# Patient Record
Sex: FEMALE | Race: BLACK OR AFRICAN AMERICAN | Employment: UNEMPLOYED | URBAN - METROPOLITAN AREA
[De-identification: names, ages, dates, MRNs, and addresses within clinical notes are randomized per-mention and may not be internally consistent; named-entity substitution may affect disease eponyms.]

---

## 2017-01-01 ENCOUNTER — APPOINTMENT (OUTPATIENT)
Dept: GENERAL RADIOLOGY | Age: 82
DRG: 064 | End: 2017-01-01
Attending: EMERGENCY MEDICINE
Payer: MEDICARE

## 2017-01-01 ENCOUNTER — APPOINTMENT (OUTPATIENT)
Dept: MRI IMAGING | Age: 82
DRG: 064 | End: 2017-01-01
Attending: INTERNAL MEDICINE
Payer: MEDICARE

## 2017-01-01 ENCOUNTER — HOSPICE ADMISSION (OUTPATIENT)
Dept: HOSPICE | Facility: HOSPICE | Age: 82
End: 2017-01-01
Payer: MEDICARE

## 2017-01-01 ENCOUNTER — APPOINTMENT (OUTPATIENT)
Dept: MRI IMAGING | Age: 82
DRG: 064 | End: 2017-01-01
Attending: PSYCHIATRY & NEUROLOGY
Payer: MEDICARE

## 2017-01-01 ENCOUNTER — HOME CARE VISIT (OUTPATIENT)
Dept: HOSPICE | Facility: HOSPICE | Age: 82
End: 2017-01-01
Payer: MEDICARE

## 2017-01-01 ENCOUNTER — APPOINTMENT (OUTPATIENT)
Dept: CT IMAGING | Age: 82
DRG: 064 | End: 2017-01-01
Attending: EMERGENCY MEDICINE
Payer: MEDICARE

## 2017-01-01 ENCOUNTER — HOSPITAL ENCOUNTER (INPATIENT)
Age: 82
LOS: 5 days | Discharge: HOSPICE | DRG: 064 | End: 2017-01-06
Attending: EMERGENCY MEDICINE | Admitting: INTERNAL MEDICINE
Payer: MEDICARE

## 2017-01-01 ENCOUNTER — HOSPITAL ENCOUNTER (INPATIENT)
Age: 82
LOS: 4 days | DRG: 065 | End: 2017-01-10
Attending: INTERNAL MEDICINE | Admitting: INTERNAL MEDICINE
Payer: OTHER MISCELLANEOUS

## 2017-01-01 VITALS
OXYGEN SATURATION: 92 % | HEART RATE: 70 BPM | RESPIRATION RATE: 25 BRPM | TEMPERATURE: 98.3 F | SYSTOLIC BLOOD PRESSURE: 160 MMHG | HEIGHT: 65 IN | DIASTOLIC BLOOD PRESSURE: 70 MMHG | WEIGHT: 190.92 LBS | BODY MASS INDEX: 31.81 KG/M2

## 2017-01-01 VITALS
RESPIRATION RATE: 24 BRPM | DIASTOLIC BLOOD PRESSURE: 94 MMHG | SYSTOLIC BLOOD PRESSURE: 187 MMHG | HEART RATE: 91 BPM | OXYGEN SATURATION: 91 % | TEMPERATURE: 96.9 F

## 2017-01-01 VITALS
OXYGEN SATURATION: 92 % | RESPIRATION RATE: 18 BRPM | WEIGHT: 191 LBS | HEART RATE: 70 BPM | SYSTOLIC BLOOD PRESSURE: 160 MMHG | BODY MASS INDEX: 31.82 KG/M2 | TEMPERATURE: 98.3 F | HEIGHT: 65 IN | DIASTOLIC BLOOD PRESSURE: 70 MMHG

## 2017-01-01 DIAGNOSIS — Z71.89 COUNSELING REGARDING GOALS OF CARE: ICD-10-CM

## 2017-01-01 DIAGNOSIS — R41.82 ALTERED MENTAL STATUS, UNSPECIFIED ALTERED MENTAL STATUS TYPE: Primary | ICD-10-CM

## 2017-01-01 DIAGNOSIS — I69.351 HEMIPLEGIA AND HEMIPARESIS FOLLOWING CEREBRAL INFARCTION AFFECTING RIGHT DOMINANT SIDE (HCC): ICD-10-CM

## 2017-01-01 DIAGNOSIS — I65.23 STENOSIS OF BOTH INTERNAL CAROTID ARTERIES: ICD-10-CM

## 2017-01-01 DIAGNOSIS — I69.320 APHASIA DUE TO RECENT CEREBROVASCULAR ACCIDENT (CVA): ICD-10-CM

## 2017-01-01 DIAGNOSIS — R55 CONVULSIVE SYNCOPE: ICD-10-CM

## 2017-01-01 DIAGNOSIS — E11.42 DIABETIC PERIPHERAL NEUROPATHY ASSOCIATED WITH TYPE 2 DIABETES MELLITUS (HCC): ICD-10-CM

## 2017-01-01 DIAGNOSIS — R55 SYNCOPE AND COLLAPSE: ICD-10-CM

## 2017-01-01 DIAGNOSIS — I63.312 THROMBOTIC STROKE INVOLVING LEFT MIDDLE CEREBRAL ARTERY (HCC): ICD-10-CM

## 2017-01-01 DIAGNOSIS — R53.81 PHYSICAL DEBILITY: ICD-10-CM

## 2017-01-01 PROBLEM — I63.9 CVA (CEREBRAL VASCULAR ACCIDENT) (HCC): Status: ACTIVE | Noted: 2017-01-01

## 2017-01-01 LAB
25(OH)D2 SERPL-MCNC: <1 NG/ML
25(OH)D3 SERPL-MCNC: 60 NG/ML
25(OH)D3+25(OH)D2 SERPL-MCNC: 61 NG/ML
ALBUMIN SERPL BCP-MCNC: 3.2 G/DL (ref 3.5–5)
ALBUMIN SERPL BCP-MCNC: 3.2 G/DL (ref 3.5–5)
ALBUMIN SERPL BCP-MCNC: 3.3 G/DL (ref 3.5–5)
ALBUMIN/GLOB SERPL: 0.7 {RATIO} (ref 1.1–2.2)
ALBUMIN/GLOB SERPL: 0.8 {RATIO} (ref 1.1–2.2)
ALBUMIN/GLOB SERPL: 0.8 {RATIO} (ref 1.1–2.2)
ALP SERPL-CCNC: 92 U/L (ref 45–117)
ALP SERPL-CCNC: 96 U/L (ref 45–117)
ALP SERPL-CCNC: 98 U/L (ref 45–117)
ALT SERPL-CCNC: 25 U/L (ref 12–78)
ALT SERPL-CCNC: 25 U/L (ref 12–78)
ALT SERPL-CCNC: 32 U/L (ref 12–78)
ANA SER QL: NEGATIVE
ANION GAP BLD CALC-SCNC: 13 MMOL/L (ref 5–15)
ANION GAP BLD CALC-SCNC: 13 MMOL/L (ref 5–15)
ANION GAP BLD CALC-SCNC: 14 MMOL/L (ref 5–15)
ANION GAP BLD CALC-SCNC: 14 MMOL/L (ref 5–15)
ANION GAP BLD CALC-SCNC: 18 MMOL/L (ref 5–15)
APPEARANCE UR: ABNORMAL
APTT PPP: 25.4 SEC (ref 22.1–32.5)
AST SERPL W P-5'-P-CCNC: 22 U/L (ref 15–37)
AST SERPL W P-5'-P-CCNC: 33 U/L (ref 15–37)
AST SERPL W P-5'-P-CCNC: 39 U/L (ref 15–37)
ATRIAL RATE: 112 BPM
BACTERIA URNS QL MICRO: NEGATIVE /HPF
BASOPHILS # BLD AUTO: 0 K/UL (ref 0–0.1)
BASOPHILS # BLD: 0 % (ref 0–1)
BILIRUB SERPL-MCNC: 0.3 MG/DL (ref 0.2–1)
BILIRUB UR QL: NEGATIVE
BUN SERPL-MCNC: 47 MG/DL (ref 6–20)
BUN SERPL-MCNC: 49 MG/DL (ref 6–20)
BUN SERPL-MCNC: 50 MG/DL (ref 6–20)
BUN SERPL-MCNC: 52 MG/DL (ref 6–20)
BUN SERPL-MCNC: 52 MG/DL (ref 6–20)
BUN/CREAT SERPL: 21 (ref 12–20)
BUN/CREAT SERPL: 22 (ref 12–20)
BUN/CREAT SERPL: 23 (ref 12–20)
CALCIUM SERPL-MCNC: 8.3 MG/DL (ref 8.5–10.1)
CALCIUM SERPL-MCNC: 8.6 MG/DL (ref 8.5–10.1)
CALCIUM SERPL-MCNC: 8.8 MG/DL (ref 8.5–10.1)
CALCIUM SERPL-MCNC: 9.3 MG/DL (ref 8.5–10.1)
CALCIUM SERPL-MCNC: 9.5 MG/DL (ref 8.5–10.1)
CALCULATED P AXIS, ECG09: 54 DEGREES
CALCULATED R AXIS, ECG10: 60 DEGREES
CALCULATED T AXIS, ECG11: 24 DEGREES
CHLORIDE SERPL-SCNC: 109 MMOL/L (ref 97–108)
CHLORIDE SERPL-SCNC: 111 MMOL/L (ref 97–108)
CHLORIDE SERPL-SCNC: 111 MMOL/L (ref 97–108)
CHLORIDE SERPL-SCNC: 112 MMOL/L (ref 97–108)
CHLORIDE SERPL-SCNC: 112 MMOL/L (ref 97–108)
CHOLEST SERPL-MCNC: 221 MG/DL
CK MB CFR SERPL CALC: 1.5 % (ref 0–2.5)
CK MB SERPL-MCNC: 2.5 NG/ML (ref 5–25)
CK SERPL-CCNC: 172 U/L (ref 26–192)
CK SERPL-CCNC: 490 U/L (ref 26–192)
CO2 SERPL-SCNC: 15 MMOL/L (ref 21–32)
CO2 SERPL-SCNC: 19 MMOL/L (ref 21–32)
CO2 SERPL-SCNC: 22 MMOL/L (ref 21–32)
CO2 SERPL-SCNC: 24 MMOL/L (ref 21–32)
CO2 SERPL-SCNC: 25 MMOL/L (ref 21–32)
COLOR UR: ABNORMAL
CREAT SERPL-MCNC: 2.22 MG/DL (ref 0.55–1.02)
CREAT SERPL-MCNC: 2.24 MG/DL (ref 0.55–1.02)
CREAT SERPL-MCNC: 2.31 MG/DL (ref 0.55–1.02)
CREAT SERPL-MCNC: 2.36 MG/DL (ref 0.55–1.02)
CREAT SERPL-MCNC: 2.41 MG/DL (ref 0.55–1.02)
DIAGNOSIS, 93000: NORMAL
EOSINOPHIL # BLD: 0 K/UL (ref 0–0.4)
EOSINOPHIL # BLD: 0.3 K/UL (ref 0–0.4)
EOSINOPHIL NFR BLD: 0 % (ref 0–7)
EOSINOPHIL NFR BLD: 3 % (ref 0–7)
EPITH CASTS URNS QL MICRO: ABNORMAL /LPF
ERYTHROCYTE [DISTWIDTH] IN BLOOD BY AUTOMATED COUNT: 14.2 % (ref 11.5–14.5)
ERYTHROCYTE [DISTWIDTH] IN BLOOD BY AUTOMATED COUNT: 14.3 % (ref 11.5–14.5)
ERYTHROCYTE [DISTWIDTH] IN BLOOD BY AUTOMATED COUNT: 14.6 % (ref 11.5–14.5)
ERYTHROCYTE [DISTWIDTH] IN BLOOD BY AUTOMATED COUNT: 14.7 % (ref 11.5–14.5)
ERYTHROCYTE [DISTWIDTH] IN BLOOD BY AUTOMATED COUNT: 15.1 % (ref 11.5–14.5)
ERYTHROCYTE [SEDIMENTATION RATE] IN BLOOD: 68 MM/HR (ref 0–30)
EST. AVERAGE GLUCOSE BLD GHB EST-MCNC: 166 MG/DL
EST. AVERAGE GLUCOSE BLD GHB EST-MCNC: 180 MG/DL
GLOBULIN SER CALC-MCNC: 4.2 G/DL (ref 2–4)
GLOBULIN SER CALC-MCNC: 4.4 G/DL (ref 2–4)
GLOBULIN SER CALC-MCNC: 4.4 G/DL (ref 2–4)
GLUCOSE BLD STRIP.AUTO-MCNC: 129 MG/DL (ref 65–100)
GLUCOSE BLD STRIP.AUTO-MCNC: 133 MG/DL (ref 65–100)
GLUCOSE BLD STRIP.AUTO-MCNC: 137 MG/DL (ref 65–100)
GLUCOSE BLD STRIP.AUTO-MCNC: 150 MG/DL (ref 65–100)
GLUCOSE BLD STRIP.AUTO-MCNC: 171 MG/DL (ref 65–100)
GLUCOSE BLD STRIP.AUTO-MCNC: 175 MG/DL (ref 65–100)
GLUCOSE BLD STRIP.AUTO-MCNC: 178 MG/DL (ref 65–100)
GLUCOSE BLD STRIP.AUTO-MCNC: 178 MG/DL (ref 65–100)
GLUCOSE BLD STRIP.AUTO-MCNC: 181 MG/DL (ref 65–100)
GLUCOSE BLD STRIP.AUTO-MCNC: 188 MG/DL (ref 65–100)
GLUCOSE BLD STRIP.AUTO-MCNC: 190 MG/DL (ref 65–100)
GLUCOSE BLD STRIP.AUTO-MCNC: 191 MG/DL (ref 65–100)
GLUCOSE BLD STRIP.AUTO-MCNC: 195 MG/DL (ref 65–100)
GLUCOSE BLD STRIP.AUTO-MCNC: 196 MG/DL (ref 65–100)
GLUCOSE BLD STRIP.AUTO-MCNC: 200 MG/DL (ref 65–100)
GLUCOSE BLD STRIP.AUTO-MCNC: 200 MG/DL (ref 65–100)
GLUCOSE BLD STRIP.AUTO-MCNC: 205 MG/DL (ref 65–100)
GLUCOSE BLD STRIP.AUTO-MCNC: 217 MG/DL (ref 65–100)
GLUCOSE BLD STRIP.AUTO-MCNC: 217 MG/DL (ref 65–100)
GLUCOSE BLD STRIP.AUTO-MCNC: 227 MG/DL (ref 65–100)
GLUCOSE SERPL-MCNC: 122 MG/DL (ref 65–100)
GLUCOSE SERPL-MCNC: 159 MG/DL (ref 65–100)
GLUCOSE SERPL-MCNC: 183 MG/DL (ref 65–100)
GLUCOSE SERPL-MCNC: 195 MG/DL (ref 65–100)
GLUCOSE SERPL-MCNC: 200 MG/DL (ref 65–100)
GLUCOSE UR STRIP.AUTO-MCNC: NEGATIVE MG/DL
HBA1C MFR BLD: 7.4 % (ref 4.2–6.3)
HBA1C MFR BLD: 7.9 % (ref 4.2–6.3)
HCT VFR BLD AUTO: 32 % (ref 35–47)
HCT VFR BLD AUTO: 32.6 % (ref 35–47)
HCT VFR BLD AUTO: 33 % (ref 35–47)
HCT VFR BLD AUTO: 33 % (ref 35–47)
HCT VFR BLD AUTO: 33.2 % (ref 35–47)
HCYS SERPL-SCNC: 21.2 UMOL/L (ref 3.7–13.9)
HDLC SERPL-MCNC: 45 MG/DL
HDLC SERPL: 4.9 {RATIO} (ref 0–5)
HGB BLD-MCNC: 10.8 G/DL (ref 11.5–16)
HGB BLD-MCNC: 10.9 G/DL (ref 11.5–16)
HGB BLD-MCNC: 11 G/DL (ref 11.5–16)
HGB BLD-MCNC: 11 G/DL (ref 11.5–16)
HGB BLD-MCNC: 11.1 G/DL (ref 11.5–16)
HGB UR QL STRIP: ABNORMAL
HYALINE CASTS URNS QL MICRO: ABNORMAL /LPF (ref 0–5)
INR PPP: 1.1 (ref 0.9–1.1)
KETONES UR QL STRIP.AUTO: NEGATIVE MG/DL
LDLC SERPL CALC-MCNC: 132.6 MG/DL (ref 0–100)
LEUKOCYTE ESTERASE UR QL STRIP.AUTO: NEGATIVE
LIPID PROFILE,FLP: ABNORMAL
LYMPHOCYTES # BLD AUTO: 14 % (ref 12–49)
LYMPHOCYTES # BLD AUTO: 16 % (ref 12–49)
LYMPHOCYTES # BLD AUTO: 33 % (ref 12–49)
LYMPHOCYTES # BLD AUTO: 9 % (ref 12–49)
LYMPHOCYTES # BLD: 1.1 K/UL (ref 0.8–3.5)
LYMPHOCYTES # BLD: 1.8 K/UL (ref 0.8–3.5)
LYMPHOCYTES # BLD: 2 K/UL (ref 0.8–3.5)
LYMPHOCYTES # BLD: 3.7 K/UL (ref 0.8–3.5)
MAGNESIUM SERPL-MCNC: 2.1 MG/DL (ref 1.6–2.4)
MAGNESIUM SERPL-MCNC: 2.1 MG/DL (ref 1.6–2.4)
MAGNESIUM SERPL-MCNC: 2.2 MG/DL (ref 1.6–2.4)
MCH RBC QN AUTO: 30.9 PG (ref 26–34)
MCH RBC QN AUTO: 31 PG (ref 26–34)
MCH RBC QN AUTO: 31.4 PG (ref 26–34)
MCH RBC QN AUTO: 32.1 PG (ref 26–34)
MCH RBC QN AUTO: 32.3 PG (ref 26–34)
MCHC RBC AUTO-ENTMCNC: 32.8 G/DL (ref 30–36.5)
MCHC RBC AUTO-ENTMCNC: 33.1 G/DL (ref 30–36.5)
MCHC RBC AUTO-ENTMCNC: 33.3 G/DL (ref 30–36.5)
MCHC RBC AUTO-ENTMCNC: 33.6 G/DL (ref 30–36.5)
MCHC RBC AUTO-ENTMCNC: 34.4 G/DL (ref 30–36.5)
MCV RBC AUTO: 93 FL (ref 80–99)
MCV RBC AUTO: 93.1 FL (ref 80–99)
MCV RBC AUTO: 93.8 FL (ref 80–99)
MCV RBC AUTO: 95.4 FL (ref 80–99)
MCV RBC AUTO: 95.7 FL (ref 80–99)
MONOCYTES # BLD: 0.8 K/UL (ref 0–1)
MONOCYTES # BLD: 0.9 K/UL (ref 0–1)
MONOCYTES # BLD: 1 K/UL (ref 0–1)
MONOCYTES # BLD: 2 K/UL (ref 0–1)
MONOCYTES NFR BLD AUTO: 16 % (ref 5–13)
MONOCYTES NFR BLD AUTO: 7 % (ref 5–13)
MONOCYTES NFR BLD AUTO: 8 % (ref 5–13)
MONOCYTES NFR BLD AUTO: 8 % (ref 5–13)
NEUTS SEG # BLD: 10 K/UL (ref 1.8–8)
NEUTS SEG # BLD: 6.3 K/UL (ref 1.8–8)
NEUTS SEG # BLD: 8.5 K/UL (ref 1.8–8)
NEUTS SEG # BLD: 9.9 K/UL (ref 1.8–8)
NEUTS SEG NFR BLD AUTO: 56 % (ref 32–75)
NEUTS SEG NFR BLD AUTO: 68 % (ref 32–75)
NEUTS SEG NFR BLD AUTO: 78 % (ref 32–75)
NEUTS SEG NFR BLD AUTO: 84 % (ref 32–75)
NITRITE UR QL STRIP.AUTO: NEGATIVE
P-R INTERVAL, ECG05: 138 MS
PH UR STRIP: 5.5 [PH] (ref 5–8)
PLATELET # BLD AUTO: 271 K/UL (ref 150–400)
PLATELET # BLD AUTO: 311 K/UL (ref 150–400)
PLATELET # BLD AUTO: 318 K/UL (ref 150–400)
PLATELET # BLD AUTO: 319 K/UL (ref 150–400)
PLATELET # BLD AUTO: 331 K/UL (ref 150–400)
POTASSIUM SERPL-SCNC: 3.1 MMOL/L (ref 3.5–5.1)
POTASSIUM SERPL-SCNC: 3.4 MMOL/L (ref 3.5–5.1)
POTASSIUM SERPL-SCNC: 4 MMOL/L (ref 3.5–5.1)
POTASSIUM SERPL-SCNC: 4.1 MMOL/L (ref 3.5–5.1)
POTASSIUM SERPL-SCNC: 4.3 MMOL/L (ref 3.5–5.1)
PROT SERPL-MCNC: 7.4 G/DL (ref 6.4–8.2)
PROT SERPL-MCNC: 7.6 G/DL (ref 6.4–8.2)
PROT SERPL-MCNC: 7.7 G/DL (ref 6.4–8.2)
PROT UR STRIP-MCNC: 100 MG/DL
PROTHROMBIN TIME: 10.7 SEC (ref 9–11.1)
Q-T INTERVAL, ECG07: 310 MS
QRS DURATION, ECG06: 84 MS
QTC CALCULATION (BEZET), ECG08: 423 MS
RBC # BLD AUTO: 3.41 M/UL (ref 3.8–5.2)
RBC # BLD AUTO: 3.46 M/UL (ref 3.8–5.2)
RBC # BLD AUTO: 3.47 M/UL (ref 3.8–5.2)
RBC # BLD AUTO: 3.5 M/UL (ref 3.8–5.2)
RBC # BLD AUTO: 3.55 M/UL (ref 3.8–5.2)
RBC #/AREA URNS HPF: ABNORMAL /HPF (ref 0–5)
SEE BELOW:, 164879: NORMAL
SERVICE CMNT-IMP: ABNORMAL
SODIUM SERPL-SCNC: 144 MMOL/L (ref 136–145)
SODIUM SERPL-SCNC: 145 MMOL/L (ref 136–145)
SODIUM SERPL-SCNC: 145 MMOL/L (ref 136–145)
SODIUM SERPL-SCNC: 149 MMOL/L (ref 136–145)
SODIUM SERPL-SCNC: 149 MMOL/L (ref 136–145)
SP GR UR REFRACTOMETRY: 1.02 (ref 1–1.03)
THERAPEUTIC RANGE,PTTT: NORMAL SECS (ref 58–77)
TRIGL SERPL-MCNC: 217 MG/DL (ref ?–150)
TROPONIN I SERPL-MCNC: 0.04 NG/ML
TSH SERPL DL<=0.05 MIU/L-ACNC: 1.59 UIU/ML (ref 0.36–3.74)
UA: UC IF INDICATED,UAUC: ABNORMAL
UROBILINOGEN UR QL STRIP.AUTO: 0.2 EU/DL (ref 0.2–1)
VENTRICULAR RATE, ECG03: 112 BPM
VIT B12 SERPL-MCNC: 1228 PG/ML (ref 211–911)
VLDLC SERPL CALC-MCNC: 43.4 MG/DL
WBC # BLD AUTO: 11.1 K/UL (ref 3.6–11)
WBC # BLD AUTO: 12 K/UL (ref 3.6–11)
WBC # BLD AUTO: 12.5 K/UL (ref 3.6–11)
WBC # BLD AUTO: 12.7 K/UL (ref 3.6–11)
WBC # BLD AUTO: 12.7 K/UL (ref 3.6–11)
WBC URNS QL MICRO: ABNORMAL /HPF (ref 0–4)

## 2017-01-01 PROCEDURE — 0651 HSPC ROUTINE HOME CARE

## 2017-01-01 PROCEDURE — 74011000250 HC RX REV CODE- 250: Performed by: NURSE PRACTITIONER

## 2017-01-01 PROCEDURE — 74011250637 HC RX REV CODE- 250/637: Performed by: INTERNAL MEDICINE

## 2017-01-01 PROCEDURE — 85025 COMPLETE CBC W/AUTO DIFF WBC: CPT | Performed by: INTERNAL MEDICINE

## 2017-01-01 PROCEDURE — 83735 ASSAY OF MAGNESIUM: CPT | Performed by: PSYCHIATRY & NEUROLOGY

## 2017-01-01 PROCEDURE — 70551 MRI BRAIN STEM W/O DYE: CPT

## 2017-01-01 PROCEDURE — G0299 HHS/HOSPICE OF RN EA 15 MIN: HCPCS

## 2017-01-01 PROCEDURE — 71010 XR CHEST PORT: CPT

## 2017-01-01 PROCEDURE — 74011636637 HC RX REV CODE- 636/637: Performed by: INTERNAL MEDICINE

## 2017-01-01 PROCEDURE — 3336500001 HSPC ELECTION

## 2017-01-01 PROCEDURE — 0656 HSPC GENERAL INPATIENT

## 2017-01-01 PROCEDURE — 82607 VITAMIN B-12: CPT | Performed by: PSYCHIATRY & NEUROLOGY

## 2017-01-01 PROCEDURE — 36415 COLL VENOUS BLD VENIPUNCTURE: CPT | Performed by: INTERNAL MEDICINE

## 2017-01-01 PROCEDURE — 74011250636 HC RX REV CODE- 250/636: Performed by: INTERNAL MEDICINE

## 2017-01-01 PROCEDURE — 93005 ELECTROCARDIOGRAM TRACING: CPT

## 2017-01-01 PROCEDURE — 65270000015 HC RM PRIVATE ONCOLOGY

## 2017-01-01 PROCEDURE — 74011000258 HC RX REV CODE- 258: Performed by: INTERNAL MEDICINE

## 2017-01-01 PROCEDURE — 84484 ASSAY OF TROPONIN QUANT: CPT | Performed by: EMERGENCY MEDICINE

## 2017-01-01 PROCEDURE — 74011250637 HC RX REV CODE- 250/637: Performed by: NURSE PRACTITIONER

## 2017-01-01 PROCEDURE — 80053 COMPREHEN METABOLIC PANEL: CPT | Performed by: EMERGENCY MEDICINE

## 2017-01-01 PROCEDURE — 84443 ASSAY THYROID STIM HORMONE: CPT | Performed by: PSYCHIATRY & NEUROLOGY

## 2017-01-01 PROCEDURE — 74011250637 HC RX REV CODE- 250/637: Performed by: EMERGENCY MEDICINE

## 2017-01-01 PROCEDURE — 65660000000 HC RM CCU STEPDOWN

## 2017-01-01 PROCEDURE — 83090 ASSAY OF HOMOCYSTEINE: CPT | Performed by: PSYCHIATRY & NEUROLOGY

## 2017-01-01 PROCEDURE — 74011000250 HC RX REV CODE- 250: Performed by: INTERNAL MEDICINE

## 2017-01-01 PROCEDURE — 82962 GLUCOSE BLOOD TEST: CPT

## 2017-01-01 PROCEDURE — 80053 COMPREHEN METABOLIC PANEL: CPT | Performed by: INTERNAL MEDICINE

## 2017-01-01 PROCEDURE — 36415 COLL VENOUS BLD VENIPUNCTURE: CPT | Performed by: EMERGENCY MEDICINE

## 2017-01-01 PROCEDURE — 82306 VITAMIN D 25 HYDROXY: CPT | Performed by: PSYCHIATRY & NEUROLOGY

## 2017-01-01 PROCEDURE — 83735 ASSAY OF MAGNESIUM: CPT | Performed by: INTERNAL MEDICINE

## 2017-01-01 PROCEDURE — 81001 URINALYSIS AUTO W/SCOPE: CPT | Performed by: EMERGENCY MEDICINE

## 2017-01-01 PROCEDURE — 80048 BASIC METABOLIC PNL TOTAL CA: CPT | Performed by: INTERNAL MEDICINE

## 2017-01-01 PROCEDURE — 82550 ASSAY OF CK (CPK): CPT | Performed by: EMERGENCY MEDICINE

## 2017-01-01 PROCEDURE — 85652 RBC SED RATE AUTOMATED: CPT | Performed by: PSYCHIATRY & NEUROLOGY

## 2017-01-01 PROCEDURE — 83036 HEMOGLOBIN GLYCOSYLATED A1C: CPT | Performed by: INTERNAL MEDICINE

## 2017-01-01 PROCEDURE — 86038 ANTINUCLEAR ANTIBODIES: CPT | Performed by: PSYCHIATRY & NEUROLOGY

## 2017-01-01 PROCEDURE — 85730 THROMBOPLASTIN TIME PARTIAL: CPT | Performed by: EMERGENCY MEDICINE

## 2017-01-01 PROCEDURE — 74011000250 HC RX REV CODE- 250: Performed by: EMERGENCY MEDICINE

## 2017-01-01 PROCEDURE — 76450000000

## 2017-01-01 PROCEDURE — 82550 ASSAY OF CK (CPK): CPT | Performed by: PSYCHIATRY & NEUROLOGY

## 2017-01-01 PROCEDURE — 93306 TTE W/DOPPLER COMPLETE: CPT

## 2017-01-01 PROCEDURE — 85027 COMPLETE CBC AUTOMATED: CPT | Performed by: INTERNAL MEDICINE

## 2017-01-01 PROCEDURE — 85025 COMPLETE CBC W/AUTO DIFF WBC: CPT | Performed by: EMERGENCY MEDICINE

## 2017-01-01 PROCEDURE — 80061 LIPID PANEL: CPT | Performed by: INTERNAL MEDICINE

## 2017-01-01 PROCEDURE — 74011250636 HC RX REV CODE- 250/636: Performed by: PSYCHIATRY & NEUROLOGY

## 2017-01-01 PROCEDURE — 95816 EEG AWAKE AND DROWSY: CPT | Performed by: PSYCHIATRY & NEUROLOGY

## 2017-01-01 PROCEDURE — 77030005538 HC CATH URETH FOL44 BARD -B

## 2017-01-01 PROCEDURE — 77030005563 HC CATH URETH INT MMGH -A

## 2017-01-01 PROCEDURE — 85610 PROTHROMBIN TIME: CPT | Performed by: EMERGENCY MEDICINE

## 2017-01-01 PROCEDURE — 70450 CT HEAD/BRAIN W/O DYE: CPT

## 2017-01-01 PROCEDURE — 93880 EXTRACRANIAL BILAT STUDY: CPT

## 2017-01-01 PROCEDURE — 99285 EMERGENCY DEPT VISIT HI MDM: CPT

## 2017-01-01 RX ORDER — INSULIN GLARGINE 100 [IU]/ML
18 INJECTION, SOLUTION SUBCUTANEOUS
Status: DISCONTINUED | OUTPATIENT
Start: 2017-01-01 | End: 2017-01-01

## 2017-01-01 RX ORDER — LABETALOL HCL 20 MG/4 ML
10 SYRINGE (ML) INTRAVENOUS
Status: DISCONTINUED | OUTPATIENT
Start: 2017-01-01 | End: 2017-01-01

## 2017-01-01 RX ORDER — MORPHINE SULFATE 2 MG/ML
2 INJECTION, SOLUTION INTRAMUSCULAR; INTRAVENOUS EVERY 4 HOURS
Status: DISCONTINUED | OUTPATIENT
Start: 2017-01-01 | End: 2017-01-01 | Stop reason: HOSPADM

## 2017-01-01 RX ORDER — DILTIAZEM HYDROCHLORIDE 5 MG/ML
10 INJECTION INTRAVENOUS ONCE
Status: COMPLETED | OUTPATIENT
Start: 2017-01-01 | End: 2017-01-01

## 2017-01-01 RX ORDER — ASPIRIN 300 MG/1
300 SUPPOSITORY RECTAL DAILY
Status: DISCONTINUED | OUTPATIENT
Start: 2017-01-01 | End: 2017-01-01 | Stop reason: SDUPTHER

## 2017-01-01 RX ORDER — LORAZEPAM 2 MG/ML
1-2 INJECTION INTRAMUSCULAR
Status: DISCONTINUED | OUTPATIENT
Start: 2017-01-01 | End: 2017-01-01 | Stop reason: HOSPADM

## 2017-01-01 RX ORDER — METOPROLOL TARTRATE 5 MG/5ML
5 INJECTION INTRAVENOUS EVERY 6 HOURS
Status: DISCONTINUED | OUTPATIENT
Start: 2017-01-01 | End: 2017-01-01

## 2017-01-01 RX ORDER — INSULIN GLARGINE 100 [IU]/ML
20 INJECTION, SOLUTION SUBCUTANEOUS
Status: DISCONTINUED | OUTPATIENT
Start: 2017-01-01 | End: 2017-01-01

## 2017-01-01 RX ORDER — SCOLOPAMINE TRANSDERMAL SYSTEM 1 MG/1
1.5 PATCH, EXTENDED RELEASE TRANSDERMAL
Status: DISCONTINUED | OUTPATIENT
Start: 2017-01-01 | End: 2017-01-01 | Stop reason: HOSPADM

## 2017-01-01 RX ORDER — SODIUM CHLORIDE 450 MG/100ML
75 INJECTION, SOLUTION INTRAVENOUS CONTINUOUS
Status: DISCONTINUED | OUTPATIENT
Start: 2017-01-01 | End: 2017-01-01

## 2017-01-01 RX ORDER — MORPHINE SULFATE 20 MG/ML
5 SOLUTION ORAL
Status: DISCONTINUED | OUTPATIENT
Start: 2017-01-01 | End: 2017-01-01

## 2017-01-01 RX ORDER — CLONIDINE 0.3 MG/24H
1 PATCH, EXTENDED RELEASE TRANSDERMAL
Status: DISCONTINUED | OUTPATIENT
Start: 2017-01-01 | End: 2017-01-01

## 2017-01-01 RX ORDER — CALCIUM CARBONATE 500(1250)
1 TABLET ORAL DAILY
Status: ON HOLD | COMMUNITY
End: 2017-01-01

## 2017-01-01 RX ORDER — DEXTROSE 50 % IN WATER (D50W) INTRAVENOUS SYRINGE
12.5-25 AS NEEDED
Status: DISCONTINUED | OUTPATIENT
Start: 2017-01-01 | End: 2017-01-01

## 2017-01-01 RX ORDER — CLONIDINE HYDROCHLORIDE 0.3 MG/1
0.3 TABLET ORAL 2 TIMES DAILY
Status: ON HOLD | COMMUNITY
End: 2017-01-01

## 2017-01-01 RX ORDER — HYDRALAZINE HYDROCHLORIDE 20 MG/ML
10 INJECTION INTRAMUSCULAR; INTRAVENOUS EVERY 6 HOURS
Status: DISCONTINUED | OUTPATIENT
Start: 2017-01-01 | End: 2017-01-01

## 2017-01-01 RX ORDER — MAGNESIUM SULFATE 100 %
4 CRYSTALS MISCELLANEOUS AS NEEDED
Status: DISCONTINUED | OUTPATIENT
Start: 2017-01-01 | End: 2017-01-01

## 2017-01-01 RX ORDER — INSULIN LISPRO 100 [IU]/ML
INJECTION, SOLUTION INTRAVENOUS; SUBCUTANEOUS EVERY 6 HOURS
Status: DISCONTINUED | OUTPATIENT
Start: 2017-01-01 | End: 2017-01-01

## 2017-01-01 RX ORDER — SODIUM CHLORIDE 9 MG/ML
50 INJECTION, SOLUTION INTRAVENOUS CONTINUOUS
Status: DISCONTINUED | OUTPATIENT
Start: 2017-01-01 | End: 2017-01-01

## 2017-01-01 RX ORDER — SODIUM CHLORIDE 0.9 % (FLUSH) 0.9 %
5-10 SYRINGE (ML) INJECTION EVERY 8 HOURS
Status: DISCONTINUED | OUTPATIENT
Start: 2017-01-01 | End: 2017-01-01 | Stop reason: HOSPADM

## 2017-01-01 RX ORDER — ACETAMINOPHEN 325 MG/1
650 TABLET ORAL
Status: DISCONTINUED | OUTPATIENT
Start: 2017-01-01 | End: 2017-01-01

## 2017-01-01 RX ORDER — DILTIAZEM HYDROCHLORIDE 5 MG/ML
10 INJECTION INTRAVENOUS ONCE
Status: ACTIVE | OUTPATIENT
Start: 2017-01-01 | End: 2017-01-01

## 2017-01-01 RX ORDER — ASPIRIN 300 MG/1
300 SUPPOSITORY RECTAL DAILY
Status: DISCONTINUED | OUTPATIENT
Start: 2017-01-01 | End: 2017-01-01

## 2017-01-01 RX ORDER — DEXTROSE, SODIUM CHLORIDE, AND POTASSIUM CHLORIDE 5; .45; .15 G/100ML; G/100ML; G/100ML
100 INJECTION INTRAVENOUS CONTINUOUS
Status: DISCONTINUED | OUTPATIENT
Start: 2017-01-01 | End: 2017-01-01

## 2017-01-01 RX ORDER — LABETALOL HCL 20 MG/4 ML
5 SYRINGE (ML) INTRAVENOUS
Status: DISCONTINUED | OUTPATIENT
Start: 2017-01-01 | End: 2017-01-01

## 2017-01-01 RX ORDER — FACIAL-BODY WIPES
10 EACH TOPICAL DAILY PRN
Status: DISCONTINUED | OUTPATIENT
Start: 2017-01-01 | End: 2017-01-01 | Stop reason: HOSPADM

## 2017-01-01 RX ORDER — SODIUM CHLORIDE 0.9 % (FLUSH) 0.9 %
5-10 SYRINGE (ML) INJECTION AS NEEDED
Status: DISCONTINUED | OUTPATIENT
Start: 2017-01-01 | End: 2017-01-01 | Stop reason: HOSPADM

## 2017-01-01 RX ORDER — GLYCOPYRROLATE 0.2 MG/ML
0.2 INJECTION INTRAMUSCULAR; INTRAVENOUS EVERY 4 HOURS
Status: DISCONTINUED | OUTPATIENT
Start: 2017-01-01 | End: 2017-01-01 | Stop reason: HOSPADM

## 2017-01-01 RX ORDER — CLONIDINE 0.3 MG/24H
1 PATCH, EXTENDED RELEASE TRANSDERMAL
Status: DISCONTINUED | OUTPATIENT
Start: 2017-01-01 | End: 2017-01-01 | Stop reason: HOSPADM

## 2017-01-01 RX ORDER — HEPARIN SODIUM 5000 [USP'U]/ML
5000 INJECTION, SOLUTION INTRAVENOUS; SUBCUTANEOUS EVERY 12 HOURS
Status: DISCONTINUED | OUTPATIENT
Start: 2017-01-01 | End: 2017-01-01

## 2017-01-01 RX ORDER — ASPIRIN 300 MG/1
300 SUPPOSITORY RECTAL ONCE
Status: COMPLETED | OUTPATIENT
Start: 2017-01-01 | End: 2017-01-01

## 2017-01-01 RX ORDER — ACETAMINOPHEN 650 MG/1
650 SUPPOSITORY RECTAL
Status: DISCONTINUED | OUTPATIENT
Start: 2017-01-01 | End: 2017-01-01 | Stop reason: HOSPADM

## 2017-01-01 RX ORDER — SODIUM CHLORIDE 9 MG/ML
100 INJECTION, SOLUTION INTRAVENOUS CONTINUOUS
Status: DISCONTINUED | OUTPATIENT
Start: 2017-01-01 | End: 2017-01-01

## 2017-01-01 RX ORDER — HYDRALAZINE HYDROCHLORIDE 20 MG/ML
10 INJECTION INTRAMUSCULAR; INTRAVENOUS
Status: DISCONTINUED | OUTPATIENT
Start: 2017-01-01 | End: 2017-01-01

## 2017-01-01 RX ORDER — ASPIRIN 81 MG/1
TABLET ORAL DAILY
Status: ON HOLD | COMMUNITY
End: 2017-01-01

## 2017-01-01 RX ORDER — GLYCOPYRROLATE 0.2 MG/ML
0.2 INJECTION INTRAMUSCULAR; INTRAVENOUS 3 TIMES DAILY
Status: COMPLETED | OUTPATIENT
Start: 2017-01-01 | End: 2017-01-01

## 2017-01-01 RX ORDER — LORAZEPAM 2 MG/ML
0.5 INJECTION INTRAMUSCULAR
Status: DISCONTINUED | OUTPATIENT
Start: 2017-01-01 | End: 2017-01-01

## 2017-01-01 RX ORDER — LORAZEPAM 2 MG/ML
0.5 CONCENTRATE ORAL EVERY 6 HOURS
Status: DISCONTINUED | OUTPATIENT
Start: 2017-01-01 | End: 2017-01-01 | Stop reason: HOSPADM

## 2017-01-01 RX ORDER — CHOLECALCIFEROL TAB 125 MCG (5000 UNIT) 125 MCG
5000 TAB ORAL DAILY
Status: ON HOLD | COMMUNITY
End: 2017-01-01

## 2017-01-01 RX ORDER — DEXTROSE MONOHYDRATE AND SODIUM CHLORIDE 5; .45 G/100ML; G/100ML
100 INJECTION, SOLUTION INTRAVENOUS CONTINUOUS
Status: DISCONTINUED | OUTPATIENT
Start: 2017-01-01 | End: 2017-01-01

## 2017-01-01 RX ORDER — ATROPINE SULFATE 10 MG/ML
1 SOLUTION/ DROPS OPHTHALMIC
Status: DISCONTINUED | OUTPATIENT
Start: 2017-01-01 | End: 2017-01-01

## 2017-01-01 RX ORDER — CLONIDINE 0.2 MG/24H
1 PATCH, EXTENDED RELEASE TRANSDERMAL
Status: DISCONTINUED | OUTPATIENT
Start: 2017-01-01 | End: 2017-01-01

## 2017-01-01 RX ORDER — INSULIN GLARGINE 100 [IU]/ML
22 INJECTION, SOLUTION SUBCUTANEOUS
Status: DISCONTINUED | OUTPATIENT
Start: 2017-01-01 | End: 2017-01-01

## 2017-01-01 RX ORDER — GLYCOPYRROLATE 0.2 MG/ML
0.2 INJECTION INTRAMUSCULAR; INTRAVENOUS
Status: DISCONTINUED | OUTPATIENT
Start: 2017-01-01 | End: 2017-01-01

## 2017-01-01 RX ORDER — POTASSIUM CHLORIDE 7.45 MG/ML
10 INJECTION INTRAVENOUS
Status: COMPLETED | OUTPATIENT
Start: 2017-01-01 | End: 2017-01-01

## 2017-01-01 RX ORDER — MORPHINE SULFATE 2 MG/ML
2 INJECTION, SOLUTION INTRAMUSCULAR; INTRAVENOUS
Status: DISCONTINUED | OUTPATIENT
Start: 2017-01-01 | End: 2017-01-01 | Stop reason: HOSPADM

## 2017-01-01 RX ORDER — LOSARTAN POTASSIUM AND HYDROCHLOROTHIAZIDE 12.5; 5 MG/1; MG/1
1 TABLET ORAL DAILY
Status: ON HOLD | COMMUNITY
End: 2017-01-01

## 2017-01-01 RX ORDER — DILTIAZEM HYDROCHLORIDE 5 MG/ML
10 INJECTION INTRAVENOUS ONCE
Status: DISCONTINUED | OUTPATIENT
Start: 2017-01-01 | End: 2017-01-01

## 2017-01-01 RX ORDER — INSULIN LISPRO 100 [IU]/ML
INJECTION, SOLUTION INTRAVENOUS; SUBCUTANEOUS
Status: DISCONTINUED | OUTPATIENT
Start: 2017-01-01 | End: 2017-01-01

## 2017-01-01 RX ADMIN — Medication 10 ML: at 05:32

## 2017-01-01 RX ADMIN — METOPROLOL TARTRATE 5 MG: 5 INJECTION INTRAVENOUS at 13:44

## 2017-01-01 RX ADMIN — Medication 10 ML: at 21:41

## 2017-01-01 RX ADMIN — ASPIRIN 300 MG: 300 SUPPOSITORY RECTAL at 10:32

## 2017-01-01 RX ADMIN — POTASSIUM CHLORIDE 10 MEQ: 10 INJECTION, SOLUTION INTRAVENOUS at 08:00

## 2017-01-01 RX ADMIN — HYDRALAZINE HYDROCHLORIDE 10 MG: 20 INJECTION INTRAMUSCULAR; INTRAVENOUS at 01:29

## 2017-01-01 RX ADMIN — HEPARIN SODIUM 5000 UNITS: 5000 INJECTION, SOLUTION INTRAVENOUS; SUBCUTANEOUS at 23:00

## 2017-01-01 RX ADMIN — METOPROLOL TARTRATE 5 MG: 5 INJECTION INTRAVENOUS at 01:35

## 2017-01-01 RX ADMIN — MORPHINE SULFATE 5 MG: 20 SOLUTION ORAL at 18:29

## 2017-01-01 RX ADMIN — Medication 10 ML: at 05:48

## 2017-01-01 RX ADMIN — HEPARIN SODIUM 5000 UNITS: 5000 INJECTION, SOLUTION INTRAVENOUS; SUBCUTANEOUS at 12:03

## 2017-01-01 RX ADMIN — LORAZEPAM 0.5 MG: 2 SOLUTION, CONCENTRATE ORAL at 05:23

## 2017-01-01 RX ADMIN — MORPHINE SULFATE 5 MG: 20 SOLUTION ORAL at 21:00

## 2017-01-01 RX ADMIN — HEPARIN SODIUM 5000 UNITS: 5000 INJECTION, SOLUTION INTRAVENOUS; SUBCUTANEOUS at 23:28

## 2017-01-01 RX ADMIN — Medication 10 ML: at 15:28

## 2017-01-01 RX ADMIN — Medication 2 MG: at 20:49

## 2017-01-01 RX ADMIN — Medication 10 ML: at 04:30

## 2017-01-01 RX ADMIN — INSULIN LISPRO 3 UNITS: 100 INJECTION, SOLUTION INTRAVENOUS; SUBCUTANEOUS at 07:33

## 2017-01-01 RX ADMIN — GLYCOPYRROLATE 0.2 MG: 0.2 INJECTION, SOLUTION INTRAMUSCULAR; INTRAVENOUS at 11:36

## 2017-01-01 RX ADMIN — MORPHINE SULFATE 5 MG: 20 SOLUTION ORAL at 08:01

## 2017-01-01 RX ADMIN — HYDRALAZINE HYDROCHLORIDE 10 MG: 20 INJECTION INTRAMUSCULAR; INTRAVENOUS at 08:02

## 2017-01-01 RX ADMIN — LORAZEPAM 0.5 MG: 2 INJECTION INTRAMUSCULAR; INTRAVENOUS at 04:05

## 2017-01-01 RX ADMIN — HYDRALAZINE HYDROCHLORIDE 10 MG: 20 INJECTION INTRAMUSCULAR; INTRAVENOUS at 20:06

## 2017-01-01 RX ADMIN — MORPHINE SULFATE 5 MG: 20 SOLUTION ORAL at 11:17

## 2017-01-01 RX ADMIN — HEPARIN SODIUM 5000 UNITS: 5000 INJECTION, SOLUTION INTRAVENOUS; SUBCUTANEOUS at 11:52

## 2017-01-01 RX ADMIN — MORPHINE SULFATE 5 MG: 20 SOLUTION ORAL at 10:53

## 2017-01-01 RX ADMIN — Medication 10 ML: at 13:44

## 2017-01-01 RX ADMIN — LORAZEPAM 0.5 MG: 2 SOLUTION, CONCENTRATE ORAL at 00:55

## 2017-01-01 RX ADMIN — SODIUM CHLORIDE 100 ML/HR: 900 INJECTION, SOLUTION INTRAVENOUS at 10:42

## 2017-01-01 RX ADMIN — METOPROLOL TARTRATE 5 MG: 5 INJECTION INTRAVENOUS at 19:49

## 2017-01-01 RX ADMIN — ATROPINE SULFATE 1 DROP: 10 SOLUTION/ DROPS OPHTHALMIC at 01:05

## 2017-01-01 RX ADMIN — LORAZEPAM 0.5 MG: 2 SOLUTION, CONCENTRATE ORAL at 18:28

## 2017-01-01 RX ADMIN — INSULIN LISPRO 4 UNITS: 100 INJECTION, SOLUTION INTRAVENOUS; SUBCUTANEOUS at 12:23

## 2017-01-01 RX ADMIN — HYDRALAZINE HYDROCHLORIDE 10 MG: 20 INJECTION INTRAMUSCULAR; INTRAVENOUS at 15:28

## 2017-01-01 RX ADMIN — HEPARIN SODIUM 5000 UNITS: 5000 INJECTION, SOLUTION INTRAVENOUS; SUBCUTANEOUS at 12:19

## 2017-01-01 RX ADMIN — ATROPINE SULFATE 1 DROP: 10 SOLUTION/ DROPS OPHTHALMIC at 10:06

## 2017-01-01 RX ADMIN — INSULIN GLARGINE 22 UNITS: 100 INJECTION, SOLUTION SUBCUTANEOUS at 22:17

## 2017-01-01 RX ADMIN — METOPROLOL TARTRATE 5 MG: 5 INJECTION INTRAVENOUS at 01:23

## 2017-01-01 RX ADMIN — LORAZEPAM 0.5 MG: 2 SOLUTION, CONCENTRATE ORAL at 11:57

## 2017-01-01 RX ADMIN — MORPHINE SULFATE 5 MG: 20 SOLUTION ORAL at 08:23

## 2017-01-01 RX ADMIN — Medication 10 ML: at 14:52

## 2017-01-01 RX ADMIN — GLYCOPYRROLATE 0.2 MG: 0.2 INJECTION, SOLUTION INTRAMUSCULAR; INTRAVENOUS at 17:49

## 2017-01-01 RX ADMIN — Medication 10 ML: at 14:06

## 2017-01-01 RX ADMIN — LORAZEPAM 0.5 MG: 2 SOLUTION, CONCENTRATE ORAL at 11:17

## 2017-01-01 RX ADMIN — LORAZEPAM 0.5 MG: 2 SOLUTION, CONCENTRATE ORAL at 17:18

## 2017-01-01 RX ADMIN — GLYCOPYRROLATE 0.2 MG: 0.2 INJECTION, SOLUTION INTRAMUSCULAR; INTRAVENOUS at 20:48

## 2017-01-01 RX ADMIN — POTASSIUM CHLORIDE 10 MEQ: 10 INJECTION, SOLUTION INTRAVENOUS at 09:58

## 2017-01-01 RX ADMIN — MORPHINE SULFATE 5 MG: 20 SOLUTION ORAL at 16:03

## 2017-01-01 RX ADMIN — LORAZEPAM 0.5 MG: 2 SOLUTION, CONCENTRATE ORAL at 17:06

## 2017-01-01 RX ADMIN — HYDRALAZINE HYDROCHLORIDE 10 MG: 20 INJECTION INTRAMUSCULAR; INTRAVENOUS at 02:40

## 2017-01-01 RX ADMIN — LORAZEPAM 0.5 MG: 2 SOLUTION, CONCENTRATE ORAL at 19:15

## 2017-01-01 RX ADMIN — METOPROLOL TARTRATE 5 MG: 5 INJECTION INTRAVENOUS at 20:22

## 2017-01-01 RX ADMIN — HYDRALAZINE HYDROCHLORIDE 10 MG: 20 INJECTION INTRAMUSCULAR; INTRAVENOUS at 01:40

## 2017-01-01 RX ADMIN — Medication 10 ML: at 22:41

## 2017-01-01 RX ADMIN — DEXTROSE MONOHYDRATE AND SODIUM CHLORIDE 100 ML/HR: 5; .45 INJECTION, SOLUTION INTRAVENOUS at 08:42

## 2017-01-01 RX ADMIN — Medication 10 ML: at 23:28

## 2017-01-01 RX ADMIN — DEXTROSE MONOHYDRATE AND SODIUM CHLORIDE 100 ML/HR: 5; .45 INJECTION, SOLUTION INTRAVENOUS at 05:47

## 2017-01-01 RX ADMIN — METOPROLOL TARTRATE 5 MG: 5 INJECTION INTRAVENOUS at 09:42

## 2017-01-01 RX ADMIN — MORPHINE SULFATE 5 MG: 20 SOLUTION ORAL at 00:55

## 2017-01-01 RX ADMIN — INSULIN LISPRO 3 UNITS: 100 INJECTION, SOLUTION INTRAVENOUS; SUBCUTANEOUS at 17:20

## 2017-01-01 RX ADMIN — Medication 10 ML: at 21:25

## 2017-01-01 RX ADMIN — LORAZEPAM 0.5 MG: 2 INJECTION INTRAMUSCULAR; INTRAVENOUS at 08:01

## 2017-01-01 RX ADMIN — INSULIN LISPRO 3 UNITS: 100 INJECTION, SOLUTION INTRAVENOUS; SUBCUTANEOUS at 17:56

## 2017-01-01 RX ADMIN — DEXTROSE MONOHYDRATE, SODIUM CHLORIDE, AND POTASSIUM CHLORIDE 100 ML/HR: 50; 4.5; 1.49 INJECTION, SOLUTION INTRAVENOUS at 10:25

## 2017-01-01 RX ADMIN — MORPHINE SULFATE 5 MG: 20 SOLUTION ORAL at 05:08

## 2017-01-01 RX ADMIN — MORPHINE SULFATE 5 MG: 20 SOLUTION ORAL at 14:06

## 2017-01-01 RX ADMIN — SODIUM CHLORIDE 100 ML/HR: 900 INJECTION, SOLUTION INTRAVENOUS at 08:16

## 2017-01-01 RX ADMIN — LORAZEPAM 0.5 MG: 2 SOLUTION, CONCENTRATE ORAL at 06:35

## 2017-01-01 RX ADMIN — MORPHINE SULFATE 5 MG: 20 SOLUTION ORAL at 01:09

## 2017-01-01 RX ADMIN — Medication 10 ML: at 19:38

## 2017-01-01 RX ADMIN — INSULIN LISPRO 3 UNITS: 100 INJECTION, SOLUTION INTRAVENOUS; SUBCUTANEOUS at 00:39

## 2017-01-01 RX ADMIN — ATROPINE SULFATE 1 DROP: 10 SOLUTION/ DROPS OPHTHALMIC at 18:28

## 2017-01-01 RX ADMIN — ATROPINE SULFATE 1 DROP: 10 SOLUTION/ DROPS OPHTHALMIC at 22:03

## 2017-01-01 RX ADMIN — INSULIN LISPRO 4 UNITS: 100 INJECTION, SOLUTION INTRAVENOUS; SUBCUTANEOUS at 19:48

## 2017-01-01 RX ADMIN — Medication 10 ML: at 08:16

## 2017-01-01 RX ADMIN — HYDRALAZINE HYDROCHLORIDE 10 MG: 20 INJECTION INTRAMUSCULAR; INTRAVENOUS at 19:49

## 2017-01-01 RX ADMIN — MORPHINE SULFATE 5 MG: 20 SOLUTION ORAL at 17:18

## 2017-01-01 RX ADMIN — HEPARIN SODIUM 5000 UNITS: 5000 INJECTION, SOLUTION INTRAVENOUS; SUBCUTANEOUS at 21:25

## 2017-01-01 RX ADMIN — Medication 10 ML: at 10:18

## 2017-01-01 RX ADMIN — GLYCOPYRROLATE 0.2 MG: 0.2 INJECTION, SOLUTION INTRAMUSCULAR; INTRAVENOUS at 17:06

## 2017-01-01 RX ADMIN — GLYCOPYRROLATE 0.2 MG: 0.2 INJECTION, SOLUTION INTRAMUSCULAR; INTRAVENOUS at 08:01

## 2017-01-01 RX ADMIN — METOPROLOL TARTRATE 5 MG: 5 INJECTION INTRAVENOUS at 08:41

## 2017-01-01 RX ADMIN — MORPHINE SULFATE 5 MG: 20 SOLUTION ORAL at 11:56

## 2017-01-01 RX ADMIN — METOPROLOL TARTRATE 5 MG: 5 INJECTION INTRAVENOUS at 15:28

## 2017-01-01 RX ADMIN — Medication 10 ML: at 22:08

## 2017-01-01 RX ADMIN — ASPIRIN 300 MG: 300 SUPPOSITORY RECTAL at 09:41

## 2017-01-01 RX ADMIN — HEPARIN SODIUM 5000 UNITS: 5000 INJECTION, SOLUTION INTRAVENOUS; SUBCUTANEOUS at 22:18

## 2017-01-01 RX ADMIN — Medication 2 MG: at 17:06

## 2017-01-01 RX ADMIN — ASPIRIN 300 MG: 300 SUPPOSITORY RECTAL at 08:41

## 2017-01-01 RX ADMIN — Medication 10 ML: at 07:34

## 2017-01-01 RX ADMIN — SODIUM BICARBONATE: 84 INJECTION, SOLUTION INTRAVENOUS at 08:00

## 2017-01-01 RX ADMIN — LORAZEPAM 0.5 MG: 2 SOLUTION, CONCENTRATE ORAL at 11:02

## 2017-01-01 RX ADMIN — INSULIN GLARGINE 18 UNITS: 100 INJECTION, SOLUTION SUBCUTANEOUS at 21:19

## 2017-01-01 RX ADMIN — INSULIN LISPRO 3 UNITS: 100 INJECTION, SOLUTION INTRAVENOUS; SUBCUTANEOUS at 08:16

## 2017-01-01 RX ADMIN — MORPHINE SULFATE 5 MG: 20 SOLUTION ORAL at 02:45

## 2017-01-01 RX ADMIN — MORPHINE SULFATE 5 MG: 20 SOLUTION ORAL at 10:04

## 2017-01-01 RX ADMIN — MORPHINE SULFATE 5 MG: 20 SOLUTION ORAL at 20:11

## 2017-01-01 RX ADMIN — GLYCOPYRROLATE 0.2 MG: 0.2 INJECTION, SOLUTION INTRAMUSCULAR; INTRAVENOUS at 21:41

## 2017-01-01 RX ADMIN — HYDRALAZINE HYDROCHLORIDE 10 MG: 20 INJECTION INTRAMUSCULAR; INTRAVENOUS at 19:38

## 2017-01-01 RX ADMIN — ASPIRIN 300 MG: 300 SUPPOSITORY RECTAL at 09:58

## 2017-01-01 RX ADMIN — GLYCOPYRROLATE 0.2 MG: 0.2 INJECTION, SOLUTION INTRAMUSCULAR; INTRAVENOUS at 01:09

## 2017-01-01 RX ADMIN — METOPROLOL TARTRATE 5 MG: 5 INJECTION INTRAVENOUS at 14:52

## 2017-01-01 RX ADMIN — LORAZEPAM 0.5 MG: 2 SOLUTION, CONCENTRATE ORAL at 23:54

## 2017-01-01 RX ADMIN — HYDRALAZINE HYDROCHLORIDE 10 MG: 20 INJECTION INTRAMUSCULAR; INTRAVENOUS at 08:41

## 2017-01-01 RX ADMIN — SODIUM CHLORIDE 50 ML/HR: 900 INJECTION, SOLUTION INTRAVENOUS at 03:35

## 2017-01-01 RX ADMIN — INSULIN LISPRO 2 UNITS: 100 INJECTION, SOLUTION INTRAVENOUS; SUBCUTANEOUS at 05:32

## 2017-01-01 RX ADMIN — LORAZEPAM 2 MG: 2 INJECTION INTRAMUSCULAR; INTRAVENOUS at 10:18

## 2017-01-01 RX ADMIN — ASPIRIN 300 MG: 300 SUPPOSITORY RECTAL at 08:17

## 2017-01-01 RX ADMIN — HEPARIN SODIUM 5000 UNITS: 5000 INJECTION, SOLUTION INTRAVENOUS; SUBCUTANEOUS at 10:30

## 2017-01-01 RX ADMIN — SODIUM CHLORIDE 100 ML/HR: 900 INJECTION, SOLUTION INTRAVENOUS at 22:43

## 2017-01-01 RX ADMIN — LORAZEPAM 0.5 MG: 2 SOLUTION, CONCENTRATE ORAL at 23:59

## 2017-01-01 RX ADMIN — INSULIN LISPRO 2 UNITS: 100 INJECTION, SOLUTION INTRAVENOUS; SUBCUTANEOUS at 23:23

## 2017-01-01 RX ADMIN — SODIUM BICARBONATE: 84 INJECTION, SOLUTION INTRAVENOUS at 20:06

## 2017-01-01 RX ADMIN — MORPHINE SULFATE 5 MG: 20 SOLUTION ORAL at 22:03

## 2017-01-01 RX ADMIN — LORAZEPAM 0.5 MG: 2 SOLUTION, CONCENTRATE ORAL at 00:16

## 2017-01-01 RX ADMIN — INSULIN LISPRO 4 UNITS: 100 INJECTION, SOLUTION INTRAVENOUS; SUBCUTANEOUS at 12:10

## 2017-01-01 RX ADMIN — DILTIAZEM HYDROCHLORIDE 10 MG: 5 INJECTION INTRAVENOUS at 03:51

## 2017-01-01 RX ADMIN — HYDRALAZINE HYDROCHLORIDE 10 MG: 20 INJECTION INTRAMUSCULAR; INTRAVENOUS at 13:44

## 2017-01-01 RX ADMIN — ATROPINE SULFATE 1 DROP: 10 SOLUTION/ DROPS OPHTHALMIC at 05:07

## 2017-01-01 RX ADMIN — METOPROLOL TARTRATE 5 MG: 5 INJECTION INTRAVENOUS at 08:01

## 2017-01-01 RX ADMIN — HEPARIN SODIUM 5000 UNITS: 5000 INJECTION, SOLUTION INTRAVENOUS; SUBCUTANEOUS at 09:44

## 2017-01-01 RX ADMIN — LORAZEPAM 0.5 MG: 2 SOLUTION, CONCENTRATE ORAL at 05:27

## 2017-01-01 RX ADMIN — LABETALOL HYDROCHLORIDE 10 MG: 5 INJECTION, SOLUTION INTRAVENOUS at 14:05

## 2017-01-01 RX ADMIN — MORPHINE SULFATE 5 MG: 20 SOLUTION ORAL at 06:36

## 2017-01-01 NOTE — PROGRESS NOTES
TRANSFER - IN REPORT:    Ver[facility] on Marissa Reynolds  being received from [facility] (unit) for routine progression of care. Report consisted of patients Situation, Background, Assessment and   Recommendations(SBAR). Information from the following report(s) SBAR and ED Summary was reviewed with the receiving nurse. Opportunity for questions and clarification was provided. Assessment completed upon patients arrival to unit and care assumed.

## 2017-01-01 NOTE — PROGRESS NOTES
Pharmacy Medication Reconciliation     The patient's daughter Amanuel Escamilla) was interviewed regarding current PTA medication list, use and drug allergies, two other family members were present in room. Allergy Update:  no update    Recommendations/Findings: The following amendments were made to the patient's active medication list on file at [facility]:   1)  Additions:       ASA EC 81mg PO Daily       Losartan/HCTZ 50mg/12.5mg PO Daily       Vitamin D3 5000 units PO Daily       Calcium elemental 500mg PO Daily    2)  Deletions:       Lisinopril 20mg PO Daily       Simvastatin 20mg PO QHS    3)  Changes:       Clonidine 0.2mg PO BID was changed to 0.3mg PO BID        The patient was questioned regarding use of any other inhalers, topical products, over the counter medications, herbal medications, vitamin products or ophthalmic/nasal/otic medication use. Prior to Admission Medications   Prescriptions Last Dose Informant Patient Reported? Taking? amLODIPine (NORVASC) 5 mg tablet 2016 at Unknown time  No Yes   Sig: Take 1.5 tabs daily   aspirin delayed-release 81 mg tablet 2016 at Unknown time  Yes Yes   Sig: Take  by mouth daily. calcium carbonate (OS-FRANKIE) 500 mg calcium (1,250 mg) tablet 2016 at Unknown time  Yes Yes   Sig: Take 1 Tab by mouth daily. cholecalciferol, VITAMIN D3, (VITAMIN D3) 5,000 unit tab tablet 2016 at Unknown time  Yes Yes   Sig: Take 5,000 Units by mouth daily. cloNIDine HCl (CATAPRES) 0.3 mg tablet 2016 at pm  Yes Yes   Sig: Take 0.3 mg by mouth two (2) times a day. insulin aspart (NOVOLOG FLEXPEN) 100 unit/mL flexpen 2016 at pm  No Yes   Si units before breakfast, 12 units before lunch and 12 units before dinner   insulin detemir (LEVEMIR FLEXPEN) 100 unit/mL (3 mL) inpn 2016  Yes Yes   Si Units by SubCUTAneous route Before breakfast and dinner.    losartan-hydroCHLOROthiazide (HYZAAR) 50-12.5 mg per tablet 2016 at Unknown time  Yes Yes   Sig: Take 1 Tab by mouth daily.              Thank you,  Quintin Ponce, [facility]

## 2017-01-01 NOTE — ED TRIAGE NOTES
Pt BIBA from home for possible stroke. 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 New onset left side weakness per EMS. 2+ pitting pedal edema noted. Pt incomprehensible sounds, no clear words spoken. Pt does not follow commands. ER MD at bedside upon arrival. Pt placed on cardiac monitor x 3 and straight cathed for urine. Assist of 3 for straight cath. Return of yellow urine.

## 2017-01-01 NOTE — ED NOTES
Evaluated by hospitalist for admission. Multiple family members remain at bedside. HOB elevated. No acute distress noted. Extra chairs provided for family.

## 2017-01-01 NOTE — PROGRESS NOTES
Primary Nurse Alma Fabian RN and Tatiana Handley RN performed a dual skin assessment on this patient. Impairment noted. (pin point hole on sacrum from cyst removal, lump on Right breast).   Antione score is 12

## 2017-01-01 NOTE — PROGRESS NOTES
Stroke Education provided to relative(s) and the following topics were discussed    1. Patients personal risk factors for stroke are hypertension, diabetes mellitus and prior stroke    2. Warning signs of Stroke:        * Sudden numbness or weakness of the face, arm or leg, especially on one side of          The body            * Sudden confusion, trouble speaking or understanding        * Sudden trouble seeing in one or both eyes        * Sudden trouble walking, dizziness, loss of balance or coordination        * Sudden severe headache with no known cause      3. Importance of activation Emergency Medical Services ( 9-1-1 ) immediately if experience any warning signs of stroke. 4. Be sure and schedule a follow-up appointment with your primary care doctor or any specialists as instructed. 5. You must take medicine every day to treat your risk factors for stroke. Be sure to take your medicines exactly as your doctor tells you: no more, no less. Know what your medicines are for , what they do. Anti-thrombotics /anticoagulants can help prevent strokes. You are taking the following medicine(s)  Heparin     6. Smoking and second-hand smoke greatly increase your risk of stroke, cardiovascular disease and death. Smoking history never    7. Information provided was [facility] Stroke Education Binder    8. Documentation of teaching completed in Patient Education Activity and on Care Plan with teaching response noted?   yes

## 2017-01-01 NOTE — ROUTINE PROCESS
TRANSFER - OUT REPORT:    Verbal report given to Yaz Encinas RN(name) on Juli Justice  being transferred to [facility](unit) for routine progression of care       Report consisted of patients Situation, Background, Assessment and   Recommendations(SBAR). Information from the following report(s) SBAR, ED Summary, [facility] and Recent Results was reviewed with the receiving nurse. Lines:   Peripheral IV 01/01/17 Right Hand (Active)   Site Assessment Clean, dry, & intact 1/1/2017  8:24 AM   Phlebitis Assessment 0 1/1/2017  8:24 AM   Infiltration Assessment 0 1/1/2017  8:24 AM   Dressing Status Clean, dry, & intact 1/1/2017  8:24 AM   Dressing Type Transparent 1/1/2017  8:24 AM   Hub Color/Line Status Blue 1/1/2017  8:24 AM        Opportunity for questions and clarification was provided.       Patient transported with:   Monitor  Registered Nurse

## 2017-01-01 NOTE — ED PROVIDER NOTES
HPI Comments: Magda Dee is a 80 y.o. female, pmhx DM / HTN / CKD / stroke, who presents via EMS to the ED for evaluation of AMS x this morning. Per EMS, family noticed pt was altered ~10 minutes after she woke this morning; unknown last well time. EMS reports a hx of R sided deficits secondary to a previous stroke. Per EMS, family reports additional dysuria yesterday. EMS notes pt had a glucose level of 468, systolic BP in the 000L, and HR in the 90s en route to the ED. EMS reports the pt was incontinent of urine en route. PCP: Mary Grace Murillo MD    Allergies: NKDA  PMHx: Significant for DM, HTN, stroke, hypercholesterolemia, polio, CKD  PSHx: Significant for hernia repair  Social Hx: -tobacco, -EtOH, -Illicit Drugs    Hx limited secondary to mental status changes. The history is provided by the EMS personnel. Past Medical History:   Diagnosis Date    Chronic kidney disease (CKD) stage G3b/A3, moderately decreased glomerular filtration rate (GFR) between 30-44 mL/min/1.73 square meter and albuminuria creatinine ratio greater than 300 mg/g 4/25/2014    Diabetes ([facility] [address]     HTN (hypertension)     Hypercholesterolemia     Need for varicella vaccine 8/15/2014    Physical debility 2/17/2014    Polio     Stroke [facility]) 2006       Past Surgical History:   Procedure Laterality Date    Hx hernia repair           Family History:   Problem Relation Age of Onset    Stroke Mother     Hypertension Mother     Diabetes Father     Hypertension Father     Stroke Father        Social History     Social History    Marital status:      Spouse name: N/A    Number of children: N/A    Years of education: N/A     Occupational History    Not on file.      Social History Main Topics    Smoking status: Never Smoker    Smokeless tobacco: Never Used    Alcohol use No    Drug use: No    Sexual activity: Not Currently     Other Topics Concern    Not on file     Social History Narrative ALLERGIES: Review of patient's allergies indicates no known allergies. Review of Systems   Unable to perform ROS: Mental status change       Vitals:    01/01/17 0720   BP: 179/89   Pulse: (!) 106   Resp: 20   Temp: 97.8 °F (36.6 °C)   SpO2: 98%   Weight: 86.6 kg (191 lb)   Height: 5' 5\" (1.651 m)            Physical Exam   Constitutional: She appears well-developed and well-nourished. HENT:   Head: Normocephalic and atraumatic. Mouth/Throat: Mucous membranes are normal.   Eyes: EOM are normal. Pupils are equal, round, and reactive to light. Neck: Normal range of motion. No JVD present. No tracheal deviation present. Cardiovascular: Normal rate, regular rhythm, normal heart sounds and intact distal pulses. Exam reveals no gallop and no friction rub. No murmur heard. Pulmonary/Chest: Effort normal and breath sounds normal. No stridor. She has no wheezes. She has no rales. Abdominal: Soft. Bowel sounds are normal. She exhibits no distension and no mass. There is no tenderness. There is no guarding. Musculoskeletal: She exhibits edema. Moving LUE spontaneously. Chronic contractures of the RUE. Atrophy to BLE. 3+ pitting edema to BLE. Neurological: She is alert. L lateral gaze; eyes do not track. L sided facial droop. Non-verbal; does not follow basic commands. Responds to painful stimuli. Skin: Skin is warm and dry. No rash noted. Psychiatric: She has a normal mood and affect. Her behavior is normal. Judgment and thought content normal.   Nursing note and vitals reviewed. Written by DANYEL Gonzalez, as dictated by Vanessa Almonte, DO     MDM  Number of Diagnoses or Management Options  Diagnosis management comments: Pt woek up altered per EMS, has a hx of CVA with residual right-sided deficits. No family here at this time. Pt nonverbal, unable to follow commands. No Code S called at this time given that patient woke up with symptoms and unknown time of onset. DDx includes CVA, metabolic abnormality, drug toxicity, uti, pneumonia, acs, hypoglycemia. Will check labs, cardiac enzymes, ekg, head CT, CXR. Likely admit. Amount and/or Complexity of Data Reviewed  Clinical lab tests: ordered and reviewed  Tests in the radiology section of CPT®: ordered and reviewed  Tests in the medicine section of CPT®: ordered and reviewed  Obtain history from someone other than the patient: yes (EMS)  Review and summarize past medical records: yes  Discuss the patient with other providers: yes (Neurology / Hospitalist)  Independent visualization of images, tracings, or specimens: yes          Procedures    Pulse Oximetry Analysis - Normal 95% on room air    Cardiac Monitor:   Rate: 107bpm   Rhythm: Sinus Tachycardia     EKG interpretation: (Preliminary)0804  Rhythm: sinus tachycardia and PAC's; and regular . Rate (approx.): 112 bpm; Axis: normal; NJ interval: normal; QRS interval: normal ; ST/T wave: non-specific changes; Other findings: possible ischemia. Written by DANYEL Lowery, as dictated by Brayan Olvera DO    PROGRESS NOTE:  9:20 AM  Pt reevaluated. Spoke with pt's family at bedside. Family states at baseline the pt is able to talk and can feed herself using her LUE and L hand. Family states the pt woke x 0600 this morning asking for her coffee and breakfast. Family reports between 0600 and 0630 the pt had an increased difficulty with feeding herself noting \"the spoon kept missing her face / mouth\". Family further notes the pt became gradually more non-verbal only grunting and slightly moving her LUE. Family reports total R sided deficits at baseline and state the pt is non-ambulatory. Written by DANYEL Loweryibe, as dictated by Brayan Olvera DO    CONSULT NOTE:   9:27 AM  Brayan Olvera DO spoke with Dr. Ev Macedo,   Specialty: Neurology  Discussed pt's hx, disposition, and available diagnostic and imaging results. Reviewed care plans. Consultant will evaluate pt via Tele-neuro monitor. Written by DANYEL Houseribzuleima, as dictated by Rani Mims DO. PROGRESS NOTE:  9:50 AM  Spoke with Dr. Moni German following his evaluation. Consultant states the pt's family declined TPA at this time. Consultant recommends admission through hospitalist for further stroke workup. Will consult with hospitalist for admission. Written by DANYEL Houseribzuleima, as dictated by Rani Mims DO    CONSULT NOTE:   9:53 AM  Rani Mims DO spoke with Nicole Perez MD,   Specialty: Hospitalist  Discussed pt's hx, disposition, and available diagnostic and imaging results. Reviewed care plans. Consultant will evaluate pt for admission. Written by DANYEL Houser, as dictated by Rani Mims DO    PROGRESS NOTE:  9:55 AM  Pt reevaluated. Updated family on all available lab and imaging findings. Family agrees with plan for admission. Written by DANYEL Houser, as dictated by Rani Mims DO      LABORATORY TESTS:  Recent Results (from the past 12 hour(s))   EKG, 12 LEAD, INITIAL    Collection Time: 01/01/17  7:48 AM   Result Value Ref Range    Ventricular Rate 112 BPM    Atrial Rate 112 BPM    P-R Interval 138 ms    QRS Duration 84 ms    Q-T Interval 310 ms    QTC Calculation (Bezet) 423 ms    Calculated P Axis 54 degrees    Calculated R Axis 60 degrees    Calculated T Axis 24 degrees    Diagnosis       Sinus tachycardia with premature atrial complexes  Nonspecific ST and T wave abnormality  Abnormal ECG  When compared with ECG of 19-MAY-2016 12:37,  premature atrial complexes are now present  Vent.  rate has increased BY  55 BPM  Nonspecific T wave abnormality now evident in Lateral leads     METABOLIC PANEL, COMPREHENSIVE    Collection Time: 01/01/17  8:15 AM   Result Value Ref Range    Sodium 144 136 - 145 mmol/L    Potassium 4.0 3.5 - 5.1 mmol/L    Chloride 109 (H) 97 - 108 mmol/L    CO2 22 21 - 32 mmol/L    Anion gap 13 5 - 15 mmol/L    Glucose 122 (H) 65 - 100 mg/dL    BUN 50 (H) 6 - 20 MG/DL    Creatinine 2.41 (H) 0.55 - 1.02 MG/DL    BUN/Creatinine ratio 21 (H) 12 - 20      GFR est AA 23 (L) >60 ml/min/1.73m2    GFR est non-AA 19 (L) >60 ml/min/1.73m2    Calcium 9.5 8.5 - 10.1 MG/DL    Bilirubin, total 0.3 0.2 - 1.0 MG/DL    ALT 25 12 - 78 U/L    AST 22 15 - 37 U/L    Alk. phosphatase 96 45 - 117 U/L    Protein, total 7.6 6.4 - 8.2 g/dL    Albumin 3.2 (L) 3.5 - 5.0 g/dL    Globulin 4.4 (H) 2.0 - 4.0 g/dL    A-G Ratio 0.7 (L) 1.1 - 2.2     CK W/ CKMB & INDEX    Collection Time: 01/01/17  8:15 AM   Result Value Ref Range     26 - 192 U/L    CK - MB 2.5 <3.6 NG/ML    CK-MB Index 1.5 0 - 2.5     CBC WITH AUTOMATED DIFF    Collection Time: 01/01/17  8:15 AM   Result Value Ref Range    WBC 11.1 (H) 3.6 - 11.0 K/uL    RBC 3.46 (L) 3.80 - 5.20 M/uL    HGB 11.1 (L) 11.5 - 16.0 g/dL    HCT 33.0 (L) 35.0 - 47.0 %    MCV 95.4 80.0 - 99.0 FL    MCH 32.1 26.0 - 34.0 PG    MCHC 33.6 30.0 - 36.5 g/dL    RDW 14.2 11.5 - 14.5 %    PLATELET 003 442 - 552 K/uL    NEUTROPHILS 56 32 - 75 %    LYMPHOCYTES 33 12 - 49 %    MONOCYTES 8 5 - 13 %    EOSINOPHILS 3 0 - 7 %    BASOPHILS 0 0 - 1 %    ABS. NEUTROPHILS 6.3 1.8 - 8.0 K/UL    ABS. LYMPHOCYTES 3.7 (H) 0.8 - 3.5 K/UL    ABS. MONOCYTES 0.9 0.0 - 1.0 K/UL    ABS. EOSINOPHILS 0.3 0.0 - 0.4 K/UL    ABS.  BASOPHILS 0.0 0.0 - 0.1 K/UL   TROPONIN I    Collection Time: 01/01/17  8:15 AM   Result Value Ref Range    Troponin-I, Qt. 0.04 <0.05 ng/mL   PTT    Collection Time: 01/01/17  8:15 AM   Result Value Ref Range    aPTT 25.4 22.1 - 32.5 sec    aPTT, therapeutic range     58.0 - 77.0 SECS   PROTHROMBIN TIME + INR    Collection Time: 01/01/17  8:15 AM   Result Value Ref Range    INR 1.1 0.9 - 1.1      Prothrombin time 10.7 9.0 - 11.1 sec   URINALYSIS W/ REFLEX CULTURE    Collection Time: 01/01/17  8:15 AM   Result Value Ref Range    Color YELLOW/STRAW      Appearance CLOUDY (A) CLEAR      Specific gravity 1.016 1.003 - 1.030      pH (UA) 5.5 5.0 - 8.0      Protein 100 (A) NEG mg/dL    Glucose NEGATIVE  NEG mg/dL    Ketone NEGATIVE  NEG mg/dL    Bilirubin NEGATIVE  NEG      Blood TRACE (A) NEG      Urobilinogen 0.2 0.2 - 1.0 EU/dL    Nitrites NEGATIVE  NEG      Leukocyte Esterase NEGATIVE  NEG      WBC 0-4 0 - 4 /hpf    RBC 0-5 0 - 5 /hpf    Epithelial cells FEW FEW /lpf    Bacteria NEGATIVE  NEG /hpf    UA:UC IF INDICATED CULTURE NOT INDICATED BY UA RESULT CNI      Hyaline Cast 0-2 0 - 5 /lpf       IMAGING RESULTS:  CT HEAD WO CONT   Final Result     EXAM: CT HEAD WO CONT     INDICATION: Altered mental status. New left-sided weakness. Old right-sided  stroke.     COMPARISON: 5/19/2016.     TECHNIQUE: Unenhanced CT of the head was performed using 5 mm images. Brain and  bone windows were generated. CT dose reduction was achieved through use of a  standardized protocol tailored for this examination and automatic exposure  control for dose modulation.      FINDINGS:  The ventricles and sulci are stable in size, shape and configuration and  midline. Moderate periventricular white matter hypodensity. There are stable  hypodensities in the right basal ganglia and the right external capsule. There  is no intracranial hemorrhage, extra-axial collection, mass, mass effect or  midline shift. The basilar cisterns are open. No acute infarct is identified. The bone windows demonstrate no abnormalities. The visualized portions of the  paranasal sinuses and mastoid air cells are clear.     IMPRESSION  IMPRESSION:      No acute intracranial process seen by noncontrast CT     XR CHEST PORT    EXAM: XR CHEST PORT     INDICATION: Acute mental status change. Left-sided weakness.     COMPARISON: 5/19/2016     FINDINGS: A portable AP radiograph of the chest was obtained at 0805 hours. The  patient is on a cardiac monitor. The lungs are clear.  The cardiac and  mediastinal contours and pulmonary vascularity are normal. There is axial  migration of both humeral heads.     IMPRESSION  IMPRESSION: No acute cardiopulmonary process seen. MEDICATIONS GIVEN:  Medications   aspirin (ASA) suppository 300 mg (not administered)       IMPRESSION:  1. Altered mental status, unspecified altered mental status type        PLAN:  1. Admit to hospitalist    9:55 AM  Patient is being admitted to the hospital by Dr. Ric Doe. The results of their tests and reasons for their admission have been discussed with them and/or available family. They convey agreement and understanding for the need to be admitted and for their admission diagnosis. Written by Jesenia Roman ED Scribe, as dictated by Duane Cisse DO. This note is prepared by Martina Mera, acting as Scribe for [phone], DO : The scribe's documentation has been prepared under my direction and personally reviewed by me in its entirety. I confirm that the note above accurately reflects all work, treatment, procedures, and medical decision making performed by me. This note will not be viewable in [address]

## 2017-01-01 NOTE — ED NOTES
Pt's family arrived at bedside. Dr Duke Mike in to speak with family to see what happened. Per family, pt normally verbal and woke up this morning not talking.

## 2017-01-01 NOTE — H&P
Hospitalist Admission Note    NAME: Lilia Oneil   :  1930   MRN:  [id number]     Date/Time:  2017 9:55 AM    Patient PCP: Edmundo Escamilla MD  ________________________________________________________________________    My assessment of this patient's clinical condition and my plan of care is as follows. Assessment / Plan:    1. Aphasia  - Patient with findings concerning for recurrent stroke, last normal this morning,  Tpa declined by family per er records  - Will admit to neuro on telemetry  - Past period of HOB flat  - Permissive hypotension for 48 hours, SBP 100s in ED, will hydrate with NS to   increase BP/cerebral perfusion  - Will hold bp meds  - MRI brain, MRA head/neck  - Continue ASA per rectum for now, will change to another agent if stroke on MRI   - Speech therapy consult  - PT/OT  - Will check A1C and lipids  - Echo if not done recently will order  - trop ngt/ekg no acute changes. No h/o c/p    2. HTN  - Will hold off anti-hypertensives for now  - Permissive HTN for 48H    3. DM  - Diabetic NPO  Till clear by speeech  - Will hold meds for now  - SSI    4. HLD  -will start full statin dose when ok  speech    5. CKD/anemia  AT [facility] IVF  AM CHEM  7/CBC      6. PPX not needed  - Heparin for DVT  7. Dispo Admit tele/neuro  CODE STATUS: DNR CONFIRMED WITH FAMILY DAUGHTER KENISHA [id number]            Subjective:   CHIEF COMPLAINT: mental status changes    HISTORY OF PRESENT ILLNESS:     Lilia Oneil is a  80 y.o. female, pmhx DM / HTN / CKD / stroke, who presents via EMS to the ED for evaluation of AMS x this morning. Per ems pt found in bed responding to painful stimuli only per family pt is not ambulatory any longer ?old va since . EMS reports a hx of R sided deficits secondary to a previous stroke. pt had a glucose level of 202, systolic BP in the 953S, and HR in the 90s en route to the ED.  Family reports no c/p,sob abdominal  pain, n/v fevers or chills no reports of uti.no h/o headaches. Non smoker/non drinker. pt family pt was in her usual state of health last night. We were asked to admit for work up and evaluation of the above problems. Past Medical History   Diagnosis Date    Chronic kidney disease (CKD) stage G3b/A3, moderately decreased glomerular filtration rate (GFR) between 30-44 mL/min/1.73 square meter and albuminuria creatinine ratio greater than 300 mg/g 4/25/2014    Diabetes ([address]     HTN (hypertension)     Hypercholesterolemia     Need for varicella vaccine 8/15/2014    Physical debility 2/17/2014    Polio     Stroke [facility]) 2006        Past Surgical History   Procedure Laterality Date    Hx hernia repair         Social History   Substance Use Topics    Smoking status: Never Smoker    Smokeless tobacco: Never Used    Alcohol use No        Family History   Problem Relation Age of Onset    Stroke Mother     Hypertension Mother     Diabetes Father     Hypertension Father     Stroke Father      No Known Allergies     Prior to Admission medications    Medication Sig Start Date End Date Taking? Authorizing Provider   BD INSULIN PEN NEEDLE UF MINI 31 x 3/16 \" ndle USE TWICE DAILY AS DIRECTED 8/30/15   Luigi Jasmine MD   simvastatin (ZOCOR) 20 mg tablet TAKE 1 TABLET BY MOUTH AT BEDTIME 4/20/15   Luigi Jasmine MD   Lancets misc Check blood sugars twice daily.  9/3/14   Luigi Jasmine MD   glucose blood VI test strips (ASCENSIA AUTODISC VI, ONE TOUCH ULTRA TEST VI) strip Check blood sugars twice daily 9/3/14   Luigi Jasmine MD   glucose blood VI test strips (ASCENSIA AUTODISC VI, ONE TOUCH ULTRA TEST VI) strip Check blood suguar twice daily 9/9/14   Luigi Jasmine MD   Blood-Glucose Meter monitoring kit Check blood sugar twice daily 9/9/14   Luigi Jasmine MD   Lancets ([facility]) misc Check blood sugar twice daily 9/9/14   Luigi Jasmine MD   amLODIPine (NORVASC) 5 mg tablet Take 1.5 tabs daily 8/15/14   Luigi Jasmine MD   insulin detemir (LEVEMIR FLEXPEN) 100 unit/mL (3 mL) pen 54 Units by SubCUTAneous route nightly. 54 units same time different locations twice nightly for total of 108 units 8/15/14   Aster Boyer MD   insulin aspart (NOVOLOG FLEXPEN) 100 unit/mL flexpen 12 units before breakfast, 12 units before lunch and 12 units before dinner 8/15/14   Aster Boyer MD   cloNIDine (CATAPRES) 0.2 mg tablet Take 1 Tab by mouth two (2) times a day. 8/15/14   Aster Boyer MD   lisinopril (PRINIVIL, ZESTRIL) 20 mg tablet Take 1 Tab by mouth daily. 8/15/14   Aster Boyer MD       REVIEW OF SYSTEMS:     I am not able to complete the review of systems because: The patient is intubated and sedated   xxx The patient has altered mental status due to his acute medical problems    The patient has baseline aphasia from prior stroke(s)    The patient has baseline dementia and is not reliable historian    The patient is in acute medical distress and unable to provide information               Objective:   VITALS:    Visit Vitals    /89 (BP 1 Location: Right arm, BP Patient Position: At rest)    Pulse (!) 106    Temp 97.8 °F (36.6 °C)    Resp 20    Ht 5' 5\" (1.651 m)    Wt 86.6 kg (191 lb)    SpO2 98%    BMI 31.78 kg/m2       PHYSICAL EXAM:    General:    Not cooperative, no distress, appears stated age. HEENT: Atraumatic, anicteric sclerae, pink conjunctivae     No oral ulcers, mucosa moist, throat clear, dentition fair  Neck:  Supple, symmetrical,  thyroid: non tender  Lungs:   Clear to auscultation bilaterally. No Wheezing or Rhonchi. No rales. Chest wall:  No tenderness  No Accessory muscle use. Heart:   Regular  rhythm,  No  murmur   +2 pedal edema brodie   Abdomen:   Soft, non-tender. Not distended. Bowel sounds normal  Extremities: No cyanosis. No clubbing      Capillary refill normal,  Radial pulse 2+,    Skin:     Not pale. Not Jaundiced  No rashes   Psych:  Unable to perform  Neurologic: +++ aphasia or slurred speech. Moving LUE spontaneously. Chronic contractures of the RUE. Neurological: respond to painful stimuli  eyes do not track. L sided facial droop. Non-verbal; does not follow basic commands. Responds to painful stimuli.   _______________________________________________________________________  Care Plan discussed with:    Comments   Patient     Family  x    RN     Care Manager                    Consultant:      _______________________________________________________________________  Expected  Disposition:   Home with Family    HH/PT/OT/RN    SNF/LTC x   MACIE    ________________________________________________________________________  TOTAL TIME:  76 Minutes    Critical Care Provided     Minutes non procedure based      Comments    xx Reviewed previous records   >50% of visit spent in counseling and coordination of care xx Discussion with patient and/or family and questions answered       ________________________________________________________________________  Signed: Lisa Solomon MD    Procedures: see electronic medical records for all procedures/Xrays and details which were not copied into this note but were reviewed prior to creation of Plan. LAB DATA REVIEWED:    Recent Results (from the past 24 hour(s))   EKG, 12 LEAD, INITIAL    Collection Time: 01/01/17  7:48 AM   Result Value Ref Range    Ventricular Rate 112 BPM    Atrial Rate 112 BPM    P-R Interval 138 ms    QRS Duration 84 ms    Q-T Interval 310 ms    QTC Calculation (Bezet) 423 ms    Calculated P Axis 54 degrees    Calculated R Axis 60 degrees    Calculated T Axis 24 degrees    Diagnosis       Sinus tachycardia with premature atrial complexes  Nonspecific ST and T wave abnormality  Abnormal ECG  When compared with ECG of 19-MAY-2016 12:37,  premature atrial complexes are now present  Vent.  rate has increased BY  55 BPM  Nonspecific T wave abnormality now evident in Lateral leads     METABOLIC PANEL, COMPREHENSIVE    Collection Time: 01/01/17 8:15 AM   Result Value Ref Range    Sodium 144 136 - 145 mmol/L    Potassium 4.0 3.5 - 5.1 mmol/L    Chloride 109 (H) 97 - 108 mmol/L    CO2 22 21 - 32 mmol/L    Anion gap 13 5 - 15 mmol/L    Glucose 122 (H) 65 - 100 mg/dL    BUN 50 (H) 6 - 20 MG/DL    Creatinine 2.41 (H) 0.55 - 1.02 MG/DL    BUN/Creatinine ratio 21 (H) 12 - 20      GFR est AA 23 (L) >60 ml/min/1.73m2    GFR est non-AA 19 (L) >60 ml/min/1.73m2    Calcium 9.5 8.5 - 10.1 MG/DL    Bilirubin, total 0.3 0.2 - 1.0 MG/DL    ALT 25 12 - 78 U/L    AST 22 15 - 37 U/L    Alk. phosphatase 96 45 - 117 U/L    Protein, total 7.6 6.4 - 8.2 g/dL    Albumin 3.2 (L) 3.5 - 5.0 g/dL    Globulin 4.4 (H) 2.0 - 4.0 g/dL    A-G Ratio 0.7 (L) 1.1 - 2.2     CK W/ CKMB & INDEX    Collection Time: 01/01/17  8:15 AM   Result Value Ref Range     26 - 192 U/L    CK - MB 2.5 <3.6 NG/ML    CK-MB Index 1.5 0 - 2.5     CBC WITH AUTOMATED DIFF    Collection Time: 01/01/17  8:15 AM   Result Value Ref Range    WBC 11.1 (H) 3.6 - 11.0 K/uL    RBC 3.46 (L) 3.80 - 5.20 M/uL    HGB 11.1 (L) 11.5 - 16.0 g/dL    HCT 33.0 (L) 35.0 - 47.0 %    MCV 95.4 80.0 - 99.0 FL    MCH 32.1 26.0 - 34.0 PG    MCHC 33.6 30.0 - 36.5 g/dL    RDW 14.2 11.5 - 14.5 %    PLATELET 278 306 - 334 K/uL    NEUTROPHILS 56 32 - 75 %    LYMPHOCYTES 33 12 - 49 %    MONOCYTES 8 5 - 13 %    EOSINOPHILS 3 0 - 7 %    BASOPHILS 0 0 - 1 %    ABS. NEUTROPHILS 6.3 1.8 - 8.0 K/UL    ABS. LYMPHOCYTES 3.7 (H) 0.8 - 3.5 K/UL    ABS. MONOCYTES 0.9 0.0 - 1.0 K/UL    ABS. EOSINOPHILS 0.3 0.0 - 0.4 K/UL    ABS.  BASOPHILS 0.0 0.0 - 0.1 K/UL   TROPONIN I    Collection Time: 01/01/17  8:15 AM   Result Value Ref Range    Troponin-I, Qt. 0.04 <0.05 ng/mL   PTT    Collection Time: 01/01/17  8:15 AM   Result Value Ref Range    aPTT 25.4 22.1 - 32.5 sec    aPTT, therapeutic range     58.0 - 77.0 SECS   PROTHROMBIN TIME + INR    Collection Time: 01/01/17  8:15 AM   Result Value Ref Range    INR 1.1 0.9 - 1.1      Prothrombin time 10.7 9.0 - 11.1 sec   URINALYSIS W/ REFLEX CULTURE    Collection Time: 01/01/17  8:15 AM   Result Value Ref Range    Color YELLOW/STRAW      Appearance CLOUDY (A) CLEAR      Specific gravity 1.016 1.003 - 1.030      pH (UA) 5.5 5.0 - 8.0      Protein 100 (A) NEG mg/dL    Glucose NEGATIVE  NEG mg/dL    Ketone NEGATIVE  NEG mg/dL    Bilirubin NEGATIVE  NEG      Blood TRACE (A) NEG      Urobilinogen 0.2 0.2 - 1.0 EU/dL    Nitrites NEGATIVE  NEG      Leukocyte Esterase NEGATIVE  NEG      WBC 0-4 0 - 4 /hpf    RBC 0-5 0 - 5 /hpf    Epithelial cells FEW FEW /lpf    Bacteria NEGATIVE  NEG /hpf    UA:UC IF INDICATED CULTURE NOT INDICATED BY UA RESULT CNI      Hyaline Cast 0-2 0 - 5 /lpf

## 2017-01-01 NOTE — ED NOTES
[facility] Opportunity for questions and clarification provided.

## 2017-01-01 NOTE — ED NOTES
Received verbal order from Dr Namita Fried that pt may be transferred to unit without nurse and without tele.

## 2017-01-01 NOTE — ED NOTES
Unable to do MRI screening with pt due to aphasia. Call placed to pt's emergency contact/daughter Teagan Feng. Message left. Awaiting return call.

## 2017-01-01 NOTE — ED NOTES
The patient's family is at the bedside, speaking with teleneurologist.  Family states that they do not want the patient to receive TPA. Risks and benefits discussed with the patient's daughter, in length. The patient's daughter has contacted her sister to consult with her as well. The patient has been repositioned in the bed for comfort and is awaiting consult for admission.

## 2017-01-02 PROBLEM — E11.42 DIABETIC PERIPHERAL NEUROPATHY ASSOCIATED WITH TYPE 2 DIABETES MELLITUS (HCC): Status: ACTIVE | Noted: 2017-01-01

## 2017-01-02 PROBLEM — R55 SYNCOPE AND COLLAPSE: Status: ACTIVE | Noted: 2017-01-01

## 2017-01-02 PROBLEM — R55 CONVULSIVE SYNCOPE: Status: ACTIVE | Noted: 2017-01-01

## 2017-01-02 NOTE — PROGRESS NOTES
Hospitalist Progress Note    NAME: Jennifer Adams   :  1930   MRN:  [id number]       Interim Hospital Summary: 80 y.o. female whom presented on 2017 with  CVA     Assessment / Plan:  Aphasia: likely due to CVA, CT head is negative, but clinically has left sided CVA, due for MRI, c/w ASA, Neurology help appreciated. HTN: she ws on clonidine, norvasc, Hyzaar, she can not swallow at this time, will c/w Labetalol prn  DM: c/w SSI, HbA1C 7.4  HLD: hold statin for now, patient can not swallow  CKD: seems at baseline, monitor. H/O Polio:  is bed bound baseline  SurrrogateChris Res  [phone] or CNTA [phone]  Code status: DNR  Prophylaxis: Hep SQ  Recommended Disposition: SNF/LTC     Subjective:     Chief Complaint / Reason for Physician Visit  Unresponsive, unable to provide a meaningful history  Discussed with RN events overnight. Review of Systems:  Symptom Y/N Comments  Symptom Y/N Comments   Fever/Chills    Chest Pain     Poor Appetite    Edema     Cough    Abdominal Pain     Sputum    Joint Pain     SOB/CRANDALL    Pruritis/Rash     Nausea/vomit    Tolerating PT/OT     Diarrhea    Tolerating Diet     Constipation    Other       Could NOT obtain due to: unresponsive     Objective:     VITALS:   Last 24hrs VS reviewed since prior progress note.  Most recent are:  Patient Vitals for the past 24 hrs:   Temp Pulse Resp BP SpO2   17 0749 99.4 °F (37.4 °C) 98 (!) 32 185/87 96 %   17 0600 - (!) 110 - 158/81 97 %   17 0330 98.5 °F (36.9 °C) 91 20 (!) 199/99 96 %   17 2253 98.9 °F (37.2 °C) 98 24 (!) 194/111 97 %   17 1917 99.6 °F (37.6 °C) 94 22 152/85 93 %   17 1659 - (!) 116 - (!) 149/121 -   17 1546 98.9 °F (37.2 °C) (!) 116 17 (!) 191/115 96 %   17 1315 98.6 °F (37 °C) (!) 118 20 (!) 159/99 96 %   17 1230 - (!) 108 23 (!) 165/92 95 %   17 1200 - (!) 117 25 (!) 199/94 95 %   17 1145 - (!) 110 26 (!) 176/109 95 %   17 1130 - (!) 116 22 (!) 189/138 95 %   01/01/17 1115 - (!) 114 25 (!) 183/99 95 %   01/01/17 1100 - (!) 111 21 (!) 185/109 95 %   01/01/17 1045 - (!) 117 29 (!) 179/111 96 %   01/01/17 1031 - (!) 115 25 (!) 175/123 92 %       Intake/Output Summary (Last 24 hours) at 01/02/17 1006  Last data filed at 01/02/17 0907   Gross per 24 hour   Intake             2240 ml   Output                0 ml   Net             2240 ml        PHYSICAL EXAM:  General: WD, WN. unresponsive, no acute distress    EENT:  EOMI. Anicteric sclerae. MMM  Resp:  Coarse BS  CV:  Regular  rhythm,  No edema  GI:  Soft, Non distended, Non tender.  +Bowel sounds  Neurologic:  Unresponsive, GCS [id number], right side paretic, conjugated gaze deviation to left side  Psych:   No  insight. Not anxious nor agitated  Skin:  No rashes. No jaundice    Reviewed most current lab test results and cultures  YES  Reviewed most current radiology test results   YES  Review and summation of old records today    NO  Reviewed patient's current orders and MAR    YES  PMH/SH reviewed - no change compared to H&P  ________________________________________________________________________  Care Plan discussed with:    Comments   Patient     Family  y    RN y    Care Manager     Consultant                        Multidiciplinary team rounds were held today with , nursing, pharmacist and clinical coordinator. Patient's plan of care was discussed; medications were reviewed and discharge planning was addressed.      ________________________________________________________________________  Total NON critical care TIME:  35  Minutes    Total CRITICAL CARE TIME Spent:   Minutes non procedure based      Comments   >50% of visit spent in counseling and coordination of care     ________________________________________________________________________  Librado Du MD     Procedures: see electronic medical records for all procedures/Xrays and details which were not copied into this note but were reviewed prior to creation of Plan. LABS:  I reviewed today's most current labs and imaging studies.   Pertinent labs include:  Recent Labs      01/02/17   0804  01/01/17   0815   WBC  12.7*  11.1*   HGB  11.0*  11.1*   HCT  32.0*  33.0*   PLT  318  319     Recent Labs      01/02/17   0346  01/01/17   0815   NA  145  144   K  4.3  4.0   CL  112*  109*   CO2  19*  22   GLU  159*  122*   BUN  47*  50*   CREA  2.22*  2.41*   CA  9.3  9.5   MG  2.1   --    ALB  3.2*  3.2*   TBILI  0.3  0.3   SGOT  33  22   ALT  25  25   INR   --   1.1       Signed: Prabhu Young MD

## 2017-01-02 NOTE — PROGRESS NOTES
Neurology Progress Note    Patient ID:  Inna Polanco  [id number]  80 y.o.  12/14/1930    Subjective:      Patient has no complaints because she is aphasic. Patient about the same, basically nonverbal, and weak on the right side and aphasic. MRI scan pending, and carotid Dopplers are pending. Talk to family members, and explained the need for her testing, and the fact that she will probably need skilled nursing facility at discharge. Continue antiplatelet therapy for now. No new change, no new symptoms from patient. Current Facility-Administered Medications   Medication Dose Route Frequency    insulin lispro (HUMALOG) injection   SubCUTAneous Q6H    labetalol (NORMODYNE;TRANDATE) 20 mg/4 mL (5 mg/mL) injection 10 mg  10 mg IntraVENous Q6H PRN    0.9% sodium chloride infusion  50 mL/hr IntraVENous CONTINUOUS    hydrALAZINE (APRESOLINE) 20 mg/mL injection 10 mg  10 mg IntraVENous Q6H PRN    cloNIDine (CATAPRES) 0.2 mg/24 hr patch 1 Patch  1 Patch TransDERmal Q7D    sodium chloride (NS) flush 5-10 mL  5-10 mL IntraVENous Q8H    sodium chloride (NS) flush 5-10 mL  5-10 mL IntraVENous PRN    acetaminophen (TYLENOL) tablet 650 mg  650 mg Oral Q4H PRN    Or    acetaminophen (TYLENOL) solution 650 mg  650 mg Per NG tube Q4H PRN    Or    acetaminophen (TYLENOL) suppository 650 mg  650 mg Rectal Q4H PRN    meperidine (DEMEROL) injection 12.5 mg  12.5 mg IntraVENous Q4H PRN    heparin (porcine) injection 5,000 Units  5,000 Units SubCUTAneous Q12H    glucose chewable tablet 16 g  4 Tab Oral PRN    dextrose (D50W) injection syrg 12.5-25 g  12.5-25 g IntraVENous PRN    glucagon (GLUCAGEN) injection 1 mg  1 mg IntraMUSCular PRN    aspirin (ASA) suppository 300 mg  300 mg Rectal DAILY    LORazepam (ATIVAN) injection 1-2 mg  1-2 mg IntraVENous Q6H PRN        Review of Systems:    Review of systems not obtained due to patient factors.     Objective:     Patient Vitals for the past 8 hrs:   BP Temp Pulse Resp SpO2   01/02/17 1719 (!) 204/94 - (!) 108 - -   01/02/17 1559 164/71 98.9 °F (37.2 °C) (!) 101 24 98 %   01/02/17 1420 178/81 - 90 - -   01/02/17 1405 (!) 197/98 - (!) 104 - -   01/02/17 1400 (!) 210/91 - 97 - -   01/02/17 1128 155/88 99.3 °F (37.4 °C) (!) 109 30 100 %       01/02 0701 - 01/02 1900  In: 582.5 [I.V.:582.5]  Out: -   12/31 1901 - 01/02 0700  In: 1930 [I.V.:1930]  Out: -     Lab Review   Recent Results (from the past 24 hour(s))   GLUCOSE, POC    Collection Time: 01/01/17 10:40 PM   Result Value Ref Range    Glucose (POC) 129 (H) 65 - 100 mg/dL    Performed by Maryan Prajapati    LIPID PANEL    Collection Time: 01/02/17  3:46 AM   Result Value Ref Range    LIPID PROFILE          Cholesterol, total 221 (H) <200 MG/DL    Triglyceride 217 (H) <150 MG/DL    HDL Cholesterol 45 MG/DL    LDL, calculated 132.6 (H) 0 - 100 MG/DL    VLDL, calculated 43.4 MG/DL    CHOL/HDL Ratio 4.9 0 - 5.0     HEMOGLOBIN A1C WITH EAG    Collection Time: 01/02/17  3:46 AM   Result Value Ref Range    Hemoglobin A1c 7.4 (H) 4.2 - 6.3 %    Est. average glucose 166 mg/dL   VITAMIN B12    Collection Time: 01/02/17  3:46 AM   Result Value Ref Range    Vitamin B12 1228 (H) 211 - 911 pg/mL   SED RATE (ESR)    Collection Time: 01/02/17  3:46 AM   Result Value Ref Range    Sed rate, automated 68 (H) 0 - 30 mm/hr   CK    Collection Time: 01/02/17  3:46 AM   Result Value Ref Range     (H) 26 - 192 U/L   MAGNESIUM    Collection Time: 01/02/17  3:46 AM   Result Value Ref Range    Magnesium 2.1 1.6 - 2.4 mg/dL   TSH, 3RD GENERATION    Collection Time: 01/02/17  3:46 AM   Result Value Ref Range    TSH 1.59 0.36 - 7.13 uIU/mL   METABOLIC PANEL, COMPREHENSIVE    Collection Time: 01/02/17  3:46 AM   Result Value Ref Range    Sodium 145 136 - 145 mmol/L    Potassium 4.3 3.5 - 5.1 mmol/L    Chloride 112 (H) 97 - 108 mmol/L    CO2 19 (L) 21 - 32 mmol/L    Anion gap 14 5 - 15 mmol/L    Glucose 159 (H) 65 - 100 mg/dL    BUN 47 (H) 6 - 20 MG/DL Creatinine 2.22 (H) 0.55 - 1.02 MG/DL    BUN/Creatinine ratio 21 (H) 12 - 20      GFR est AA 25 (L) >60 ml/min/1.73m2    GFR est non-AA 21 (L) >60 ml/min/1.73m2    Calcium 9.3 8.5 - 10.1 MG/DL    Bilirubin, total 0.3 0.2 - 1.0 MG/DL    ALT 25 12 - 78 U/L    AST 33 15 - 37 U/L    Alk. phosphatase 98 45 - 117 U/L    Protein, total 7.4 6.4 - 8.2 g/dL    Albumin 3.2 (L) 3.5 - 5.0 g/dL    Globulin 4.2 (H) 2.0 - 4.0 g/dL    A-G Ratio 0.8 (L) 1.1 - 2.2     HOMOCYST(E)INE, PLASMA    Collection Time: 01/02/17  4:37 AM   Result Value Ref Range    Homocysteine, plasma 21.2 (H) 3.7 - 13.9 umol/L   GLUCOSE, POC    Collection Time: 01/02/17  7:55 AM   Result Value Ref Range    Glucose (POC) 191 (H) 65 - 100 mg/dL    Performed by Renetta Harris    CBC WITH AUTOMATED DIFF    Collection Time: 01/02/17  8:04 AM   Result Value Ref Range    WBC 12.7 (H) 3.6 - 11.0 K/uL    RBC 3.41 (L) 3.80 - 5.20 M/uL    HGB 11.0 (L) 11.5 - 16.0 g/dL    HCT 32.0 (L) 35.0 - 47.0 %    MCV 93.8 80.0 - 99.0 FL    MCH 32.3 26.0 - 34.0 PG    MCHC 34.4 30.0 - 36.5 g/dL    RDW 14.3 11.5 - 14.5 %    PLATELET 517 736 - 925 K/uL    NEUTROPHILS 78 (H) 32 - 75 %    LYMPHOCYTES 14 12 - 49 %    MONOCYTES 8 5 - 13 %    EOSINOPHILS 0 0 - 7 %    BASOPHILS 0 0 - 1 %    ABS. NEUTROPHILS 9.9 (H) 1.8 - 8.0 K/UL    ABS. LYMPHOCYTES 1.8 0.8 - 3.5 K/UL    ABS. MONOCYTES 1.0 0.0 - 1.0 K/UL    ABS. EOSINOPHILS 0.0 0.0 - 0.4 K/UL    ABS. BASOPHILS 0.0 0.0 - 0.1 K/UL   GLUCOSE, POC    Collection Time: 01/02/17 12:01 PM   Result Value Ref Range    Glucose (POC) 200 (H) 65 - 100 mg/dL    Performed by Donya Rojas, POC    Collection Time: 01/02/17  3:57 PM   Result Value Ref Range    Glucose (POC) 178 (H) 65 - 100 mg/dL    Performed by Kathrine Carrizales        Additional comments:I reviewed the patients new imaging test results. MRI        NEUROLOGICAL EXAM:    Appearance: The patient is well developed, well nourished, provides a incoherent history and is aphasic .    Mental Status:  recent aphasic, nonverbal, somewhat lethargic, does focus and look at family on the left side . Cranial Nerves:   Dense right homonymous hemianopsia on visual fields. Fundi are poorly seen. RIYA, EOM's full, no nystagmus, no ptosis. Facial sensation is normal. Corneal reflexes are not tested. Facial movement is weak on the right. Hearing is abnormal bilaterally. Palate is midline with normal sternocleidomastoid and trapezius muscles are normal. Tongue is midline. Motor:  5/5 strength in upper and lower proximal and distal muscles on the left, and patient has moderate severe weakness in the right upper extremity, and severe weakness in the right lower extremity with atrophy and muscle wasting secondary to her previous polio. Normal bulk and tone except for the right lower extremity. No fasciculations. Reflexes:   Deep tendon reflexes 1+/4 and symmetrical.  No Babinski or clonus present    Sensory:   Patient withdraws to pain in all extremities, and pin also, cannot do cortical sensory testing . Gait:  Not ambulatory . Tremor:   No tremor noted. Cerebellar:  No cerebellar signs present. Neurovascular:  Normal heart sounds and regular rhythm, peripheral pulses decreased, and no carotid bruits. Assessment:     Active Problems:    CVA (cerebral vascular accident) ([address] (1/1/2017)      Thrombotic stroke involving left middle cerebral artery ([address] (1/1/2017)      Stenosis of both internal carotid arteries (1/1/2017)      Aphasia due to recent cerebrovascular accident (CVA) (1/1/2017)      Hemiplegia and hemiparesis following cerebral infarction affecting right dominant side ([address] (1/1/2017)        Plan:     Patient's MRI scan does show moderate sized posterior left parietal occipital infarct, which fits with patient's symptoms. Do the MRI scan personally on the PACS system today and her labs  Main problem is can patient swallow, will work with speech and OT and PT.   Continue antiplatelet therapy and treating her lipids. She will probably need skilled nursing facility at discharge  Patient remained stable but significantly impaired from her new stroke  We will follow closely, and discussed with family in detail today.       Signed:  Cesario Sanon MD  1/2/2017  6:50 PM

## 2017-01-02 NOTE — PROGRESS NOTES
PCU SHIFT NURSING NOTE      Bedside and Verbal shift change report given to Pennie Maya (oncoming nurse) by Daya Torrez (offgoing nurse). Report included the following information SBAR, Kardex, Procedure Summary, Intake/Output, MAR and Recent Results. Shift Summary: 7303  0800: Patient laying in bed, resting on right side. Eyes opening to stimulus and voice only, patient not following commands and unable to assess mentation. Incontinent of urine, linens changed and patient repositioned. 0915: Family at bedside  0945: Dr. Marcin Waite in to see patient and talk with family, palliative care consult placed. 1020: Patient given 2 mg of Ativan for MRI, off unit for MRI  1130: Back from MRI, heart rate tachy with frequent PAC's, low grade temperature at 99.3. Patient lethargic, but arousable to stimuli and opens eyes when turned and repositioned in bed. Family at bedside  1300: EEG being done at bedside  1410: Patient's blood pressure elevated at 198/78, PRN IV Labetalol given. Patient repositioned in bed, continues to be lethargic and only arousable/opening eyes to stimulus and movement. Family continues to be at bedside.   1600: Palliative care  in to see patient and talk with patient's family, palliative care meeting scheduled for tomorrow at 2 pm with palliative NP, [facility]    Admission Date 1/1/2017   Admission Diagnosis CVA (cerebral vascular accident) ([facility] [address] [facility]   Consults IP CONSULT TO NEUROLOGY        Consults   [x]PT   [x]OT   [x]Speech   []Case Management      [x] Palliative      Cardiac Monitoring Order   [x]Yes   []No     IV drips   []Yes    Drip:                            Dose:  Drip:                            Dose:  Drip:                            Dose:   []No     GI Prophylaxis   [x]Yes   [x]No         DVT Prophylaxis   SCDs:             Omero stockings:         [x] Medication   []Contraindicated   []None      Activity Level Activity Level: Bed Rest     Activity Assistance: Complete care   Purposeful Rounding every 1-2 hour? [x]Yes   San Score  Total Score: 4   Bed Alarm (If score 3 or >)   []Yes   [] Refused (See signed refusal form in chart)   Antione Score  Antione Score: 10   Antione Score (if score 14 or less)   [x]PMT consult   []Wound Care consult      []Specialty bed   [x] Nutrition consult          Needs prior to discharge:   Home O2 required:    []Yes   [x]No    If yes, how much O2 required? Other:    Last Bowel Movement: Last Bowel Movement Date: 01/01/17 (per chart)      Influenza Vaccine Received Flu Vaccine for Current Season (usually Sept-March): Yes        Pneumonia Vaccine           Diet Active Orders   Diet    DIET NPO      LDAs               Peripheral IV 01/01/17 Right Hand (Active)   Site Assessment Clean, dry, & intact 1/2/2017  3:30 AM   Phlebitis Assessment 0 1/2/2017  3:30 AM   Infiltration Assessment 0 1/2/2017  3:30 AM   Dressing Status Clean, dry, & intact 1/2/2017  3:30 AM   Dressing Type Transparent;Tape 1/2/2017  3:30 AM   Hub Color/Line Status Blue;Flushed; Infusing 1/2/2017  4:30 AM                      Urinary Catheter      Intake & Output   Date 01/01/17 0700 - 01/02/17 0659 01/02/17 0700 - 01/03/17 0659   Shift 3805-0415 1803-6409 24 Hour Total 9557-2154 7089-8130 24 Hour Total   I  N  T  A  K  E   I.V.  (mL/kg/hr) 780  (0.8)  780  (0.4)         Volume (0.9% sodium chloride infusion) 780  780       Shift Total  (mL/kg) 780  (9)  780  (9)      O  U  T  P  U  T   Urine  (mL/kg/hr)            Urine Occurrence(s) 1 x 2 x 3 x       Shift Total  (mL/kg)           780      Weight (kg) 86.6 86.6 86.6 86.6 86.6 86.6         Readmission Risk Assessment Tool Score High Risk            32       Total Score        3 Relationship with PCP    4 More than 1 Admission in calendar year    9 Patient Insurance is Medicare, Medicaid or Self Pay    16 Charlson Comorbidity Score        Criteria that do not apply:    Patient Living Status    Patient Length of Stay > 5       Expected Length of Stay - - -   Actual Length of Stay 1

## 2017-01-02 NOTE — PROGRESS NOTES
Speech pathology  Orders received, chart reviewed and spoke with RN. RN reports patient does not follow commands and  received 2mg of Ativan this morning for MRI and is very lethargic. Patient not appropriate for formal swallow evaluation at this time. Will f/u later this afternoon pending level of alertness.    Thank you, Sonja Donald M.S. CCC-SLP

## 2017-01-02 NOTE — PROGRESS NOTES
Spiritual Care Assessment/Progress Notes    Ruthy Schlatter [id number]  [id number]    12/14/1930  80 y.o.  female    Patient Telephone Number: [phone] (home)   Mu-ism Affiliation: No preference   Language: English   Extended Emergency Contact Information  Primary Emergency Contact: [address] [place] [address] Phone: [phone]  Relation: Child   Patient Active Problem List    Diagnosis Date Noted    CVA (cerebral vascular accident) ([facility] 01/01/2017    [facility] involving [facility] artery [facility] 01/01/2017    Stenosis of both internal carotid arteries 01/01/2017    Aphasia due to recent cerebrovascular accident (CVA) 01/01/2017    Hemiplegia and hemiparesis following cerebral infarction affecting right dominant side ([facility] 01/01/2017    Need for varicella vaccine 08/15/2014    Chronic kidney disease (CKD) stage G3b/A3, moderately decreased glomerular filtration rate (GFR) between 30-44 mL/min/1.73 square meter and albuminuria creatinine ratio greater than 300 mg/g 04/25/2014    Poliomyelitis osteopathy of lower leg ([facility] 02/17/2014    HTN, goal below 130/80 02/17/2014    Diabetes mellitus, type 2 ([facility] 02/17/2014    Hypercholesterolemia 02/17/2014    Physical debility 02/17/2014        Date: 1/2/2017       Level of Mu-ism/Spiritual Activity:  []         Involved in jazmín tradition/spiritual practice    []         Not involved in jazmín tradition/spiritual practice  []         Spiritually oriented    []         Claims no spiritual orientation    []         seeking spiritual identity  []         Feels alienated from Moravian practice/tradition  []         Feels angry about Moravian practice/tradition  []         Spirituality/Moravian tradition a resource for coping at this time.   [x]         Not able to assess due to medical condition    Services Provided Today:  []         crisis intervention    []         reading Scriptures  []         spiritual assessment    []         prayer  []         empathic listening/emotional support  []         rites and rituals (cite in comments)  []         life review     []         Catholic support  []         theological development   []         advocacy  []         ethical dialog     []         blessing  []         bereavement support    [x]         support to family  []         anticipatory grief support   []         help with AMD  []         spiritual guidance    []         meditation      Spiritual Care Needs  []         Emotional Support  []         Spiritual/Rastafarian Care  []         Loss/Adjustment  []         Advocacy/Referral                /Ethics  []         No needs expressed at               this time  [x]         Other: (note in               comments)  Spiritual Care Plan  []         Follow up visits with               pt/family  []         Provide materials  []         Schedule sacraments  []         Contact Community               Clergy  [x]         Follow up as needed  []         Other: (note in               comments)     Comments: Visited with pt's family after palliative consult received and at request of Palliative NP to help coordinate a family meeting for tomorrow. Pt had several visitors, including one of pt's daughters, Jayne Vaz. Introduced myself to pt's family. Pt's sister indicated that sister Riya Shah whom pt lives with was the best person from the family to speak with, and Jayne Vaz had left the hospital.  Provided support to family in pt's room, exploring any needs. None expressed at this time. Following visit, reached out to pt's daughter Michaela Oropeza by phone ([phone]). Introduced myself and services of Palliative team. Pt's daughter available to meet tomorrow;  meeting scheduled for tomorrow 1/3/17 at 2:00pm with daughter Riya Shah and Palliative NP, Alba Mercer.     Sharmila Staples, Palliative

## 2017-01-02 NOTE — PROGRESS NOTES
PCU SHIFT NURSING NOTE      Bedside and Verbal shift change report given to Matti Rodney Rn (oncoming nurse) by Courtney Hardy RN (offgoing nurse). Report included the following information SBAR, Kardex, ED Summary, Intake/Output, MAR, Recent Results and Cardiac Rhythm NSR/Sinus tach. Shift Summary:   0430- While bathing pt, excoriation noted under left breast. Barrier cream applied. Admission Date 1/1/2017   Admission Diagnosis CVA (cerebral vascular accident) ([facility] [facility]   Consults IP CONSULT TO NEUROLOGY        Consults   [x]PT   [x]OT   [x]Speech   [x]Case Management      [] Palliative      Cardiac Monitoring Order   [x]Yes   []No     IV drips   []Yes    Drip:                            Dose:  Drip:                            Dose:  Drip:                            Dose:   [x]No     GI Prophylaxis   [x]Yes   []No         DVT Prophylaxis   SCDs:             Omero stockings:         [x] Medication   []Contraindicated   []None      Activity Level Activity Level: Bed Rest     Activity Assistance: Complete care   Purposeful Rounding every 1-2 hour? [x]Yes   San Score  Total Score: 4   Bed Alarm (If score 3 or >)   [x]Yes   [] Refused (See signed refusal form in chart)   Antione Score  Antione Score: 10   Antione Score (if score 14 or less)   [x]PMT consult   []Wound Care consult      []Specialty bed   [] Nutrition consult          Needs prior to discharge:   Home O2 required:    []Yes   [x]No    If yes, how much O2 required?     Other:    Last Bowel Movement: Last Bowel Movement Date: 01/01/17 (per chart)      Influenza Vaccine Received Flu Vaccine for Current Season (usually Sept-March): Yes        Pneumonia Vaccine           Diet Active Orders   Diet    DIET NPO      LDAs               Peripheral IV 01/01/17 Right Hand (Active)   Site Assessment Clean, dry, & intact 1/2/2017  3:30 AM   Phlebitis Assessment 0 1/2/2017  3:30 AM   Infiltration Assessment 0 1/2/2017  3:30 AM   Dressing Status Clean, dry, & intact 1/2/2017  3:30 AM   Dressing Type Transparent;Tape 1/2/2017  3:30 AM   Hub Color/Line Status Blue;Flushed; Infusing 1/2/2017  4:30 AM                      Urinary Catheter      Intake & Output   Date 01/01/17 0700 - 01/02/17 0659 01/02/17 0700 - 01/03/17 0659   Shift 0700-1859 1900-0659 24 Hour Total 0700-1859 1900-0659 24 Hour Total   I  N  T  A  K  E   I.V.  (mL/kg/hr) 780  (0.8)  780         Volume (0.9% sodium chloride infusion) 780  780       Shift Total  (mL/kg) 780  (9)  780  (9)      O  U  T  P  U  T   Urine  (mL/kg/hr)            Urine Occurrence(s) 1 x 2 x 3 x       Shift Total  (mL/kg)           780      Weight (kg) 86.6 86.6 86.6 86.6 86.6 86.6         Readmission Risk Assessment Tool Score High Risk            32       Total Score        3 Relationship with PCP    4 More than 1 Admission in calendar year    9 Patient Insurance is Medicare, Medicaid or Self Pay    16 Charlson Comorbidity Score        Criteria that do not apply:    Patient Living Status    Patient Length of Stay > 5       Expected Length of Stay - - -   Actual Length of Stay 1

## 2017-01-02 NOTE — PROGRESS NOTES
PT consult received and chart reviewed. Per chart, RN, patient unable to follow commands at this time and remains non-verbal. Noted prior history of polio with chart stating patient non ambulatory at baseline. Per RN, MD to consult palliative care. Will defer at this time and f/u tomorrow. Chaparrita Alanis PT, DPT.

## 2017-01-02 NOTE — PROGRESS NOTES
Occupational Therapy  OT referral received,chart reviewed. Per chart, RN, patient unable to follow commands at this time and remains non-verbal. Noted prior history of polio with chart stating patient non ambulatory at baseline. Per RN, MD to consult palliative care. Pt is not appropriate for OT evaluation at this moment. Will defer at this time and f/u tomorrow.  Ruthanne Hodgkin, OT

## 2017-01-02 NOTE — PROGRESS NOTES
Pressure Ulcer Prevention In basket Alert Received for Antione < 14 (moderate risk).      Suggested Care Plan/Interventions for Nursing  1. Complete Antione Pressure Ulcer Risk Scale and use sub scores to identify appropriate interventions. 2. Perform Assessment: skin, changes in LOC, visual cues for pain, monitor skin under medical devices  3. Respond to Reduced Sensory Perception: changes in LOC, check visual cues for pain, float heels, suspension boots, pressure redistribution bed/mattress/chair cushion, turning and reposition approximately every 2 hours (pillows & wedges), pad between skin to skin, turn & reposition  4. Manage Moisture: absorbent under pads, internal / external urinary device, internal /  external fecal device, minimize layers, contain wound drainage, access need for specialty bed, limit adult briefs, maintain skin hydration (lotion/cream), moisture barrier, offer toileting every hour  5. Promote Activity: increase time out of bed, chair cushion, PT/OT evaluation, trapeze to reposition, pressure redistribution bed/mattress/chair  6. Address Reduced Mobility: float heels / suspension boot, HOB 30 degrees or less, pressure redistribution bed/mattress/cushion, PT / OT evaluation, turn and reposition approximately every 2 hours (pillows & wedges)  7. Promote Nutrition: document food / fluid / supplement intake, encourage/assist with meals as needed  8. Reduce Friction and Shear: transferring/repositioning devices (lift/draw sheet), lift team/ patient mobility team, feet elevated on foot rest, minimize layers, foam dressing / transparent film / skin sealants, protective barrier creams and emollients, transfer aides (board, Lenore lift, ceiling lift, stand assist), HOB 30 degrees or less, trapeze to reposition.   Wound Care Team

## 2017-01-02 NOTE — PROGRESS NOTES
Cm met with patient and family for initial assessment. Pt is aphasic and unable to communicate. Verified with family alternate forms of communication. They are unaware of any at this time. Cm reviewed information with Ranelle Dandy (daughter). Pt name and  confirmed as pt identifiers. Pt demographics confirmed. Pt admitted 2017 CVA. Nery Germain [phone] added as emergency contact. (daughter)    Patient lives with her daughter and son-in-law in a 2 story home, with ramp entry. Patient's living quarters on floor of entry. Ramp available for access. Patient has been bedbound since approximately 2016. Ranelle Dandy and her  provide care to patient. Occasionally a grand daughter assists as available.  And Mrs Genesis Uriostegui work and provider alternate care. Patient can feed self if meals are prepared and brought to her. Patient requires 1 person assist for all other care. Pt remains in bed . DME at home:  Hospital bed and wheelchair. Preferred pharmacy is [facility]. No previous experience with HH/SNF. Medicaid list for [address] [place] [address] and SNF list provided to Ms. Genesis Uriostegui. Ms. Genesis Uriostegui is undecided regarding SNF at this time. There is no compensation to family for providing care. CM will initiate [facility]. Care Management Interventions  PCP Verified by CM:  Yes  Transition of Care Consult (CM Consult): Discharge Planning  Physical Therapy Consult: Yes  Occupational Therapy Consult: Yes  Current Support Network: Lives with Caregiver  Plan discussed with Pt/Family/Caregiver: Yes  Discharge Location  Discharge Placement: Bernadette Gupta RN   Ext [phone]

## 2017-01-02 NOTE — DIABETES MGMT
DTC Progress Note    Recommendations/ Comments: Consult received for medication recommendations. Would consider beginning pt on lantus 20units daily and once eating add prandial humalog 6units ac tid. Once lantus started, would change correctional insulin to normal sensitivity scale. Pt reported last dose of levemir was 12/31/16 per PTA med list.  BG now 191 with no basal insulin. Chart reviewed on Marissa Reynolds. Patient is a 80 y.o. female with known Type 2 Diabetes on levemir 38units ac b/d and novolog 12units ac tid at home. A1c:   Lab Results   Component Value Date/Time    Hemoglobin A1c 7.4 01/02/2017 03:46 AM    Hemoglobin A1c 7.9 01/01/2017 08:15 AM       Recent Glucose Results:   Lab Results   Component Value Date/Time     (H) 01/02/2017 03:46 AM    GLUCPOC 191 (H) 01/02/2017 07:55 AM    GLUCPOC 129 (H) 01/01/2017 10:40 PM    GLUCPOC 133 (H) 01/01/2017 05:14 PM        Lab Results   Component Value Date/Time    Creatinine 2.22 01/02/2017 03:46 AM       Active Orders   Diet    DIET NPO        PO intake: No data found. Current hospital DM medication: humalog correction resistant scale    Will continue to follow as needed.     Thank you  Marilynn Carter RN, [address]

## 2017-01-02 NOTE — CONSULTS
[address]    [facility]       Name:  Sera Palm   MR#:  [id number]   :  1930   Account #:  [de-identified]    Date of Consultation:  2017   Date of Adm:  2017       CHIEF COMPLAINT: Loss of speech. HISTORY OF PRESENT ILLNESS: We were requested to evaluate   this 80-year-old right-handed [facility] female as a new   patient for new onset of sudden loss of speech this morning around 6   A.m by the hospitalist Dr. Wendy Nowak. The patient awoke this morning initially, was able to request   coffee. While she was drinking the coffee she suddenly became less   responsive, was not talking to the family. Has not put out much verbal   output since that time. The patient has weakness on the right side,   some of this from an old stroke, apparently in the past. She came to   the hospital in April of this year for some right-sided weakness. She   was hospitalized in May of this year and had a questionable stroke with   right-sided weakness. Head CT scan was negative. The patient had some   persistent weakness on that side apparently. I do not really see where   she was diagnosed with a stroke at that time, however, by the chart   records. She also has a history of polio and has a weak right leg and not   ambulatory now. She has not been able to speak much since this new   onset of aphasia. She does not seem to be moving her right arm as   much either. She does have a right field cut. Family has refused TPA   at this time. The patient does have some memory problems at home   and has been forgetful. She denies any chest pain or palpitations, but   she seems to have an irregular heart rate, but mainly the EKG was read out   as APCs. The patient seems to have some previous unreadable EKGs   from May. The patient has denied any chest pain or palpitations. MRI   scan is ordered. She does not have a history of pacemaker. The   patient had no unusual trauma, or other precipitating events for this   episode. PAST MEDICAL HISTORY   1. Pertinent for organic heart disease, followed by Dr. Leanna Conte. 2. She has a history of chronic kidney disease, stage 3, moderately   decreased GFR. 3. Diabetes mellitus type 2.   4. Hypertension. 5. Hypercholesterolemia. 6. Physical disability, polio with weakness in the legs, right greater than   left. 7. History of stroke in 2006 with residual right hemiparesis. ALLERGIES: THE PATIENT HAS NO KNOWN ALLERGIES TO   MEDICATIONS. SOCIAL HISTORY: The patient does not drink, does not smoke. She is   , lives with her daughter. She takes insulin injections. MEDICATIONS   1. She is on NovoLog insulin 12 units before breakfast, lunch and   dinner. 2. She takes Levemir 54 years twice a night for a total of 108 units at   nighttime. 3. She takes Norvasc 1-1/2 tablets each day. 4. She takes Zocor 20 mg a day. 5. She takes Zestril 20 mg a day. 6.  She takes a baby aspirin every day  7. She takes Catapres 0.2 mg twice a day. REVIEW OF SYSTEMS: Unobtainable from the patient. The patient has 6 children with a history of breast cancer, blood   pressure and diabetes. Siblings have a history of Alzheimer disease. Mother had heart disease and stroke and father's history is unknown. NEUROLOGIC EXAMINATION   VITAL SIGNS:  Shows that the patient has a temperature of 98   degrees. Her pulse is 108 and irregular. Blood pressure is 165/92,   respirations are 22. The patient is completely aphasic. Does not follow commands. There   is a mutter, an occasional sound. No words. The patient does look to   the left and seems to focus her eyes some. Recognizes I think her   daughter. Has complete loss of vision on the right side. A dense right   hemiplegia with minimal withdrawal on the right arm, no movement of   right leg, which has atrophied from her previous polio.  The patient has   no Babinski signs present. Deep tendon reflexes are diffusely   hypoactive. She does move the left side well, seems to have normal   strength on the left side. She has right central facial weakness. She   has pupils that are sluggishly reactive. Extraocular motility does   appear to be intact. Sensory examination seemed to be intact in all   extremities. Cortical sensory testing could not be done. Muscle tone   is hypotonic on the right side. Normal left side. The patient is not   ambulatory because of her overall medical condition. No clear   cerebellar signs present. Hearing is not testable. She seemed to move   her mouth and oral very well. No objective weakness there. Muscle   tone is normal. She is neat in appearance. HEAD, EAR, EYES, NOSE AND THROAT:  Examination showed no   significant lesions. There are old cataracts. NECK: Neck is supple without bruits, with mild limitation of range of motion. CHEST: Essentially clear to auscultation. CARDIAC: Shows a regular rhythm. A soft systolic ejection murmur. Slight tachycardia. No gallops. ABDOMEN: Obese and benign. EXTREMITIES: Shows atrophy of the right lower extremity. She has   decreased pulses in all extremities. She is not ambulatory, probably   bedridden. Pulses are decreased in the feet. SKIN: She has no rashes. No neurocutaneous lesions seen. MUSCULOSKELETAL:  She has a lot of degenerative arthritis but no   acute swollen joints. LYMPHATICS: No lymphadenopathy or tender nodes. SYSTEMIC:  No fever. No meningismus. NEUROPSYCH:  The patient is nonverbal.  I cannot really assess her   on neuropsych testing. 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 Need to monitor for any kind of   arrhythmia that could cause embolic phenomenon. Need to rule out   other causes of middle cerebral artery. Continue aspirin therapy at this time   as she cannot swallow.  We will follow closely. Obtain MRI of the brain   and MRA of the head and neck without contrast due to renal   insufficiency. I talked to the family, told them she has a good size stroke and may   produce significant deficits. Need to watch for any impending edema. Need to rule out cardioembolic phenomena. We need to get PT, OT   and speech therapy to see the patient. Continue medications as you   are. Continue excellent medical care as you are. A very difficult case.         MD JLUIS Olivares / JENSEN   D:  01/01/2017   14:24   T:  01/01/2017   19:44   Job #:  [id number]

## 2017-01-03 PROBLEM — Z71.89 COUNSELING REGARDING GOALS OF CARE: Status: ACTIVE | Noted: 2017-01-01

## 2017-01-03 NOTE — ROUTINE PROCESS
PCU SHIFT NURSING NOTE      Bedside shift change report given to Cris Badillo (oncoming nurse) by Adrienne Sage (offgoing nurse). Report included the following information SBAR, Kardex, Intake/Output, MAR, Recent Results and Cardiac Rhythm ST. Shift Summary: Pt resting in bed. Nonverbal at this time. Non interactive. BP elevated. Hydralazine given. Repositioned for comfort throughout night. Hydralazine given again at 0130. Hospitalist paged at [phone] to notify of critical value CO2 15. No orders received at this time. Admission Date 1/1/2017   Admission Diagnosis CVA (cerebral vascular accident) ([facility] [address]   Consults IP CONSULT TO NEUROLOGY  IP CONSULT TO PALLIATIVE CARE - PROVIDER        Consults   []PT   []OT   []Speech   []Case Management      [] Palliative      Cardiac Monitoring Order   []Yes   []No     IV drips   []Yes    Drip:                            Dose:  Drip:                            Dose:  Drip:                            Dose:   []No     GI Prophylaxis   []Yes   []No         DVT Prophylaxis   SCDs:             Omero stockings:         [] Medication   []Contraindicated   []None      Activity Level Activity Level: Bed Rest     Activity Assistance: Complete care   Purposeful Rounding every 1-2 hour? []Yes   San Score  Total Score: 2   Bed Alarm (If score 3 or >)   []Yes   [] Refused (See signed refusal form in chart)   Antione Score  Antione Score: 14   Antione Score (if score 14 or less)   []PMT consult   []Wound Care consult      []Specialty bed   [] Nutrition consult          Needs prior to discharge:   Home O2 required:    []Yes   []No    If yes, how much O2 required?     Other:    Last Bowel Movement: Last Bowel Movement Date: 01/01/17 (per chart)      Influenza Vaccine Received Flu Vaccine for Current Season (usually Sept-March): Yes        Pneumonia Vaccine           Diet Active Orders   Diet    DIET NPO      LDAs               Peripheral IV 01/01/17 Right Hand (Active)   Site Assessment Clean, dry, & intact 1/2/2017  3:51 PM   Phlebitis Assessment 0 1/2/2017  3:51 PM   Infiltration Assessment 0 1/2/2017  3:51 PM   Dressing Status Clean, dry, & intact 1/2/2017  3:51 PM   Dressing Type Tape;Transparent 1/2/2017  3:51 PM   Hub Color/Line Status Infusing 1/2/2017  3:51 PM                      Urinary Catheter      Intake & Output   Date 01/02/17 0700 - 01/03/17 0659 01/03/17 0700 - 01/04/17 0659   Shift 5454-8232 6941-3563 24 Hour Total 0700-1859 1900-0659 24 Hour Total   I  N  T  A  K  E   P. O. 0  0         P. O. 0  0       I.V.  (mL/kg/hr) 582.5  (0.6)  582.5         Volume (0.9% sodium chloride infusion) 310  310         Volume (0.9% sodium chloride infusion) 272.5  272.5       Shift Total  (mL/kg) 582.5  (6.7)  582.5  (6.7)      O  U  T  P  U  T   Urine  (mL/kg/hr)            Urine Occurrence(s) 2 x  2 x       Shift Total  (mL/kg)         .5  582.5      Weight (kg) 86.6 86.6 86.6 86.6 86.6 86.6         Readmission Risk Assessment Tool Score High Risk            44       Total Score        3 Relationship with PCP    4 More than 1 Admission in calendar year    9 Patient Insurance is Medicare, Medicaid or Self Pay    28 Charlson Comorbidity Score        Criteria that do not apply:    Patient Living Status    Patient Length of Stay > 5       Expected Length of Stay - - -   Actual Length of Stay 2

## 2017-01-03 NOTE — PROGRESS NOTES
Chart reviewed. Attempted to see pt for PT evaluation. Pt is not able to actively participate, not following commands, and extremely lethargic/unable to keep eyes open. Only noted voluntary movement of L UE and LE only. Will follow back tomorrow pending appropriateness for therapy. Thank you. Noted Palliative care is meeting with family Thursday. Tyler Hernandez.  Alejandra Dewey, TARUNT

## 2017-01-03 NOTE — PROCEDURES
[address], [address]   EEG       Name:  Kendrick Martins   MR#:  [id number]   :  1930   Account #:  [de-identified]    Date of Procedure:  2017   Date of Adm:  2017       CLINICAL INDICATION: The patient is an 66-year-old female with   sudden collapse, sudden loss of speech. The patient for EEG to rule   out seizures, rule out cortical abnormality. Rule out encephalopathy. The patient with sudden collapse, possible convulsive syncope,   possible seizure. EEG to rule out seizures, rule out cortical   abnormality. EEG CLASSIFICATION: Dysrhythmia grade 2, generalized, maximum   left hemisphere, maximum left temporoparietal area. DESCRIPTION OF THE RECORD: The background of this recording   contains a posteriorly located occipital alpha rhythm of 8 to 9 Hz seen   in the posterior head regions, in the right hemisphere slightly better   than the left hemisphere, that were somewhat of poor quality and seen   only occasionally. Throughout the recording, there is a moderate   increase in generalized 2 to 6 Hz activity seen with this being more   prominent in the left hemisphere as compared to the right. No clear   spike or spike-and-wave discharges were seen. The patient did enter   brief states of sleep with K-complexes and sleep spindles seen in the   central head regions. Photic stimulation produced little change in   background recording. Hyperventilation was not performed. INTERPRETATION: This is a moderately abnormal   electroencephalogram due to the widespread and generalized slow-  wave activity seen, most consistent with a diffuse encephalopathy of   toxic, metabolic or degenerative type. In addition, there was more focal   slowing seen in the left hemisphere, raising the possibility of a more   focal process or a dysrhythmic activity coming from that area in the   frontal temporal region.  No clear seizures or spike discharges   recorded. Clinical correlation recommended.         MD Star Guardado / Samara Feliciano   D:  01/02/2017   16:02   T:  01/02/2017   19:48   Job #:  [id number]

## 2017-01-03 NOTE — PROGRESS NOTES
Interdisciplinary team rounds were held 1/3/2017 with the following team members:Care Management, Nursing, Pharmacy, Physical Therapy and Physician and the patient. Plan of care discussed. See clinical pathway and/or care plan for interventions and desired outcomes.     IV bicarb; echo; palliative meeting Thursday at 3 pm

## 2017-01-03 NOTE — ACP (ADVANCE CARE PLANNING)
Pt's next of kin are her 10 adult daughters: Peña Dumont (lives in [place]), Rosmery, Ovi, Rosmery, and Rema. They will all be at hospital on Thurs, Jan. 5th (addendum 11:15) for 3 pm family meeting with Palliative team and providers.

## 2017-01-03 NOTE — CONSULTS
Palliative Medicine Consult  Rd: [phone] ([phone])    Patient Name: Marissa Reynolds  YOB: 1930    Date of Initial Consult: 1/3/17  Reason for Consult: Care Decisions  Requesting Provider: Kurt Palomares  Primary Care Physician: Manish Lehman MD      SUMMARY:   Marissa Reynolds is a 80y.o. year old with a past history of DM / HTN / CKD / left side stroke with residual right-sided weakness, who was admitted on 1/1/2017 from home with a diagnosis of CVA. Current medical issues leading to Palliative Medicine involvement include: new onset of aphasia, awake, alert, unresponsive. PALLIATIVE DIAGNOSES:   1. Acute encephalopathy in setting of old left sided CVA in 2006:  Head CT neg but clinically has left sided CVA: MRI scan does show       moderate sized posterior left parietal occipital infarct, which fits with patient's symptoms  2. Aphasia   3. Urinary incontinence  4. H/o polio with weak right leg  5. CKD stage 3  6. DM  7. Care decisions     PLAN:   1. Met with daughter Priscilla Schaefer, (daughter Marvin Rucker was on the phone), and Palliative NI Gregory: discussed results of MRI, goals of care; family has no idea what pt's wishes are. Counseled family that pt's needs will be more complexed than prior to this admission given aphasia, dysphagia, inability to feed self or help with transfers, etc.  Daughter's requested a family meeting with all 6 of pt's daughter's later this week for decision-making. We will meet on Thursday at 3pm.  2. Initial consult note routed to primary continuity provider  3.  Communicated plan of care with: Palliative BELINDA, RN, Dr. Aguilera Keepers:   [====Goals of Care====]  Code Status: DNR   Patient / family stated goals:    [] Reverse acute illness using all generally accepted medical interventions  [x] Reverse acute illness, with limitations:   [] Comfort care  [] Other:   [====Goals of Care====]      Advance Care Planning 1/1/2017   Patient's Healthcare Decision Maker is: Legal Next of Shine 69   Primary Decision Maker Name Saritha Escamilla and 5 other daughter of Pt   Primary Decision Maker Phone Number [phone]   Primary Decision Maker Relationship to Patient Adult child   Confirm Advance Directive None   Patient Would Like to Complete Advance Directive No   Does the patient have other document types Other (comment)           HISTORY:     History obtained from: daughter    CHIEF COMPLAINT: [facility] for evaluation of AMS; family reports pt was altered x 10 min after she woke on am of admission. She has residual right sided deficits secondary to previous stroke. Family refused TPA in ED. Prior to admission, pt was able to assist with transfers, feed self, carry normal conversations.      HPI/SUBJECTIVE:    The patient is:   [] Verbal and participatory  [x] Non-participatory due to:        Clinical Pain Assessment (nonverbal scale for nonverbal patients): Pain: 0  Adult Non-Verbal Pain Assessment    Face  [x] 0   No particular expression or smile  [] 1   Occasional grimace, tearing, frowning, wrinkled forehead  [] 2   Frequent grimace, tearing, frowning, wrinkled forehead    Activity (movement)  [x] 0   Lying quietly, normal position  [] 1   Seeking attention through movement or slow, cautious movement  [] 2   Restless, excessive activity and/or withdrawal reflexes    Guarding  [x] 0   Lying quietly, no positioning of hands over areas of body  [] 1   Splinting areas of the body, tense  [] 2   Rigid, stiff    Physiology (vital signs)  [x] 0   Stable vital signs  [] 1   Change in any of the following: SBP > 20mm Hg; HR > 20/minute  [] 2   Change in any of the following: SBP > 30mm Hg; HR > 25/minute    Respiratory  [x] 0   Baseline RR/SpO2, compliant with ventilator  [] 1   RR > 10 above baseline, or 5% drop SpO2, mild asynchrony with ventilator  [] 2   RR > 20 above baseline, or 10% drop SpO2, asynchrony with ventilator    Total Non-Verbal Pain Score: 0       FUNCTIONAL ASSESSMENT: Palliative Performance Scale (PPS):  PPS: 10       PSYCHOSOCIAL/SPIRITUAL SCREENING:     Advance Care Planning:  Advance Care Planning 1/1/2017   Patient's Healthcare Decision Maker is: Legal Next of Shine Howard   Primary Decision Maker Name Marco Carson and 5 other daughter of Pt   Primary Decision Maker Phone Number [phone]   Primary Decision Maker Relationship to Patient Adult child   Confirm Advance Directive None   Patient Would Like to Complete Advance Directive No   Does the patient have other document types Other (comment)        Any spiritual / Yarsani concerns:  [] Yes /  [x] No    Caregiver Burnout:  [] Yes /  [x] No /  [] No Caregiver Present      Anticipatory grief assessment:   [x] Normal  / [] Maladaptive       ESAS Anxiety:      ESAS Depression:          REVIEW OF SYSTEMS:     Positive and pertinent negative findings in ROS are noted above in HPI. The following systems were [] reviewed / [x] unable to be reviewed as noted in HPI  Other findings are noted below. Systems: constitutional, ears/nose/mouth/throat, respiratory, gastrointestinal, genitourinary, musculoskeletal, integumentary, neurologic, psychiatric, endocrine. Positive findings noted below. Modified ESAS Completed by: provider           Pain: 0         Anorexia: 0 Dyspnea: 0     Constipation: No              PHYSICAL EXAM:     Wt Readings from Last 3 Encounters:   01/01/17 191 lb (86.6 kg)   05/19/16 191 lb 2.2 oz (86.7 kg)     Blood pressure 186/47, pulse (!) 121, temperature 97.1 °F (36.2 °C), resp. rate 22, height 5' 5\" (1.651 m), weight 191 lb (86.6 kg), SpO2 100 %, not currently breastfeeding.   Pain:  Pain Scale 1: Visual  Pain Intensity 1: 0              Pain Intervention(s) 1: Repositioned  Last bowel movement:     Constitutional: obese, sleeping, easily awakened but falls back to sleep quickly  Eyes: pupils equal, anicteric  ENMT: no nasal discharge, moist mucous membranes  Cardiovascular: regular rhythm, distal pulses intact  Respiratory: breathing not labored, symmetric  Gastrointestinal: soft non-tender, +bowel sounds  Musculoskeletal: right UE weakness, right LE contracted  Skin: warm, dry  Neurologic: not following commands, not moving all extremities  Psychiatric: unable to assess due to pt factors     HISTORY:     Active Problems:    CVA (cerebral vascular accident) ([facility] (1/1/2017)      Thrombotic stroke involving left middle cerebral artery ([facility] (1/1/2017)      Stenosis of both internal carotid arteries (1/1/2017)      Aphasia due to recent cerebrovascular accident (CVA) (1/1/2017)      Hemiplegia and hemiparesis following cerebral infarction affecting right dominant side ([facility] (1/1/2017)      Diabetic peripheral neuropathy associated with type 2 diabetes mellitus ([facility] (1/2/2017)      Syncope and collapse (1/2/2017)      Convulsive syncope ([facility] (1/2/2017)      Past Medical History   Diagnosis Date    Chronic kidney disease (CKD) stage G3b/A3, moderately decreased glomerular filtration rate (GFR) between 30-44 mL/min/1.73 square meter and albuminuria creatinine ratio greater than 300 mg/g 4/25/2014    Diabetes ([facility]     HTN (hypertension)     Hypercholesterolemia     Need for varicella vaccine 8/15/2014    Physical debility 2/17/2014    Polio     Stroke [facility]) 2006      Past Surgical History   Procedure Laterality Date    Hx hernia repair        Family History   Problem Relation Age of Onset    Stroke Mother     Hypertension Mother     Diabetes Father     Hypertension Father     Stroke Father       History reviewed, no pertinent family history.   Social History   Substance Use Topics    Smoking status: Never Smoker    Smokeless tobacco: Never Used    Alcohol use No     No Known Allergies   Current Facility-Administered Medications   Medication Dose Route Frequency    sodium bicarbonate (8.4%) 150 mEq in dextrose 5% 850 mL infusion   IntraVENous CONTINUOUS    metoprolol (LOPRESSOR) injection 5 mg  5 mg IntraVENous Q6H    nitroglycerin (NITROBID) 2 % ointment 1 Inch  1 Inch Topical Q6H PRN    insulin lispro (HUMALOG) injection   SubCUTAneous Q6H    cloNIDine (CATAPRES) 0.2 mg/24 hr patch 1 Patch  1 Patch TransDERmal Q7D    sodium chloride (NS) flush 5-10 mL  5-10 mL IntraVENous Q8H    sodium chloride (NS) flush 5-10 mL  5-10 mL IntraVENous PRN    acetaminophen (TYLENOL) tablet 650 mg  650 mg Oral Q4H PRN    Or    acetaminophen (TYLENOL) suppository 650 mg  650 mg Rectal Q4H PRN    meperidine (DEMEROL) injection 12.5 mg  12.5 mg IntraVENous Q4H PRN    heparin (porcine) injection 5,000 Units  5,000 Units SubCUTAneous Q12H    glucose chewable tablet 16 g  4 Tab Oral PRN    dextrose (D50W) injection syrg 12.5-25 g  12.5-25 g IntraVENous PRN    glucagon (GLUCAGEN) injection 1 mg  1 mg IntraMUSCular PRN    aspirin (ASA) suppository 300 mg  300 mg Rectal DAILY    LORazepam (ATIVAN) injection 1-2 mg  1-2 mg IntraVENous Q6H PRN          LAB AND IMAGING FINDINGS:     Lab Results   Component Value Date/Time    WBC 12.0 01/03/2017 02:22 AM    HGB 10.9 01/03/2017 02:22 AM    PLATELET 227 34/03/0866 02:22 AM     Lab Results   Component Value Date/Time    Sodium 145 01/03/2017 02:22 AM    Potassium 4.1 01/03/2017 02:22 AM    Chloride 112 01/03/2017 02:22 AM    CO2 15 01/03/2017 02:22 AM    BUN 52 01/03/2017 02:22 AM    Creatinine 2.36 01/03/2017 02:22 AM    Calcium 8.8 01/03/2017 02:22 AM    Magnesium 2.1 01/03/2017 02:22 AM      Lab Results   Component Value Date/Time    AST 39 01/03/2017 02:22 AM    Alk.  phosphatase 92 01/03/2017 02:22 AM    Protein, total 7.7 01/03/2017 02:22 AM    Albumin 3.3 01/03/2017 02:22 AM    Globulin 4.4 01/03/2017 02:22 AM     Lab Results   Component Value Date/Time    INR 1.1 01/01/2017 08:15 AM    Prothrombin time 10.7 01/01/2017 08:15 AM    aPTT 25.4 01/01/2017 08:15 AM      No results found for: IRON, FE, TIBC, IBCT, PSAT, FERR   No results found for: PH, PCO2, PO2  No components found for: [place]   Lab Results   Component Value Date/Time     01/02/2017 03:46 AM    CK - MB 2.5 01/01/2017 08:15 AM                Total time: 75 min  Counseling / coordination time: 65 min  > 50% counseling / coordination?: yes    Prolonged service was provided for  []30 min   []75 min in face to face time in the presence of the patient. Time Start:   Time End:   Note: this can only be billed with 18337 (initial) or 79191 (follow up). If multiple start / stop times, list each separately.

## 2017-01-03 NOTE — PROGRESS NOTES
Occupational Therapy  Chart reviewed. Referral received. Attempted to see pt for OT evaluation. Pt is not able to actively participate, not following commands, and extremely lethargic/unable to keep eyes open. Only noted voluntary movement of L UE and LE only. Will follow back tomorrow pending appropriateness for therapy. Thank you. Noted Palliative care is meeting with family Thursday.   Katie Rodriguez, OT

## 2017-01-03 NOTE — PROGRESS NOTES
Speech pathology note  Reviewed chart and discussed case with RN who reports patient remains lethargic despite not getting Ativan today. Attempted to see patient for swallowing evaluation, however patient did not arouse to max stimulation, including max repositioning, verbal/tactile stimulation, wet washcloth on face, and cold, wet spoon placed to lips. Patient not appropriate for PO trials at this time secondary to lethargy. Provided education to daughter present in room regarding risk of aspiration secondary to lethargy, and daughter verbalized understanding. SLP to follow up tomorrow for swallowing evaluation. Thank you.     Kurt Leon., CCC-SLP

## 2017-01-04 NOTE — PROGRESS NOTES
Chart reviewed. Attempted to see pt this AM for PT evaluation however pt remains unable to actively participate as she follows no commands (0% of simple, one-step commands), remains extremely lethargic and unable to keep eyes open. However, pt noted to be actively resisting this author's attempts at LUE PROM. No purposeful movements noted of any extremity. Pt presentation has remained unchanged over previous 3 attempts at PT evaluation therefore will complete order. Please re-consult if pt becomes able to activity participate or pending family wishes following palliative meeting scheduled for Thurs. , 1/5/17. Thank you,    Lucille Mendez.  Hayde Juarez, TARUNT

## 2017-01-04 NOTE — PROGRESS NOTES
Hospitalist Progress Note    NAME: Clemencia Gomez   :  1930   MRN:  [id number]         Assessment / Plan:  Atrial fibrillation with RVR:  Intermittent RVR over the last 24 hours  - echo 1/3 with normal EF as below  - con't IV metoprolol  - prn diltiazem gtt for breakthrough - responded well to 10mg IV diltiazem dose overnight  HTN, benign/essential: controlled  - con't clonidine patch, IV metoprolol andIV hydralazine scheduled  - holding home norvasc, losartan-HCTZ due to NPO status  - prn nitrobid  Insulin-dependent DM2 controlled with nephropathy:  HgA1c 7.4, some hyperglycemia  - increase lantus, still at reduced dose (NPO but receiving fluids with dextrose)  - lispro sliding scale  Acute L MCA CVA: +aphasia, inability to follow commands, R sided weakness. More agitated today but still unable to participate. - MRI 1/2 with acute, moderate-sized left posterior MCA territory infarction. Background of extensive chronic ischemic changes and areas of prior hemorrhage.  - echo 1/3 with EF 60%. Suboptimal endocardial visualization limits wall motion analysis. There was moderate concentric hypertrophy. - carotid dopplers: <50% stenosis in the right internal carotid artery. Unable to access left carotid artery system due to patient uncooperative during exam.  - appreciate neuro consult  - Pt still not able to participate with PT/OT/speech due to acute changes. PT/OT have signed off. Speech will attempt one more time tomorrow. - palliative care consult appreciated, plan for family meeting tomorrow. Have discussed with daughter today that recommendation will be for Hospice care if continues to not be able to participate in any therapies.   HLD: hold statin while NPO  CKD4: seems at baseline?  - holding nephrotoxins (HCTZ, losartan)  - admission UA reviewed and is benign  H/o polio: be-bound at baseline  Morbid obesity     SurrrogateZella Piety [phone] or Alicia [phone]  Code status: DNR  Prophylaxis: Heparin     Subjective:     Chief Complaint / Reason for Physician Visit  Agitated, not following commands. Moves L hand spontaneously. Discussed with RN events overnight. Review of Systems:  Symptom Y/N Comments  Symptom Y/N Comments   Fever/Chills    Chest Pain     Poor Appetite    Edema     Cough    Abdominal Pain     Sputum    Joint Pain     SOB/CRANDALL    Pruritis/Rash     Nausea/vomit    Tolerating PT/OT     Diarrhea    Tolerating Diet     Constipation    Other       Could NOT obtain due to: Stroke, agitation     Objective:     VITALS:   Last 24hrs VS reviewed since prior progress note. Most recent are:  Patient Vitals for the past 24 hrs:   Temp Pulse Resp BP SpO2   01/04/17 1216 98.7 °F (37.1 °C) 85 24 (!) 135/116 96 %   01/04/17 0815 98.5 °F (36.9 °C) 73 22 (!) 124/97 98 %   01/04/17 0538 - 90 - (!) 151/92 -   01/04/17 0414 - 81 - 134/58 -   01/04/17 0351 - (!) 164 - (!) 177/93 -   01/04/17 0319 99.1 °F (37.3 °C) (!) 163 20 (!) 171/98 100 %   01/04/17 0240 - 87 - 147/88 -   01/04/17 0149 - 90 - - -   01/04/17 0123 - (!) 169 - 132/88 -   01/03/17 2327 - - - - 97 %   01/03/17 2309 98.9 °F (37.2 °C) 94 21 159/68 97 %   01/03/17 2102 - 70 - 164/84 -   01/03/17 2022 - 94 - 191/65 -   01/03/17 1959 99.1 °F (37.3 °C) (!) 106 22 192/83 97 %   01/03/17 1450 99.8 °F (37.7 °C) (!) 106 24 (!) 172/132 99 %       Intake/Output Summary (Last 24 hours) at 01/04/17 1240  Last data filed at 01/04/17 0657   Gross per 24 hour   Intake           1927.8 ml   Output                0 ml   Net           1927.8 ml        PHYSICAL EXAM:  General: WD, WN. Not alert, not cooperative, moderate acute distress    EENT:  EOM not intact. Anicteric sclerae. MMM  Resp:  CTA bilaterally, no wheezing or rales.   No accessory muscle use  CV:  Tachycardic, irregular rhythm,  No edema  GI:  Soft, mildly distended, Non tender.  +Bowel sounds  Neurologic:  Remains oriented X 0, no speech,   Psych:   No insight. Not anxious, +moderately agitated  Skin:  No rashes. No jaundice    Reviewed most current lab test results and cultures  YES  Reviewed most current radiology test results   YES  Review and summation of old records today    NO  Reviewed patient's current orders and MAR    YES  PMH/SH reviewed - no change compared to H&P  ________________________________________________________________________  Care Plan discussed with:    Comments   Patient x    Family  x daughter   RN x    Care Manager x    Consultant                       x Multidiciplinary team rounds were held today with , nursing, pharmacist and clinical coordinator. Patient's plan of care was discussed; medications were reviewed and discharge planning was addressed. ________________________________________________________________________  Total NON critical care TIME:  35 Minutes    Total CRITICAL CARE TIME Spent:   Minutes non procedure based      Comments   >50% of visit spent in counseling and coordination of care x    ________________________________________________________________________  Marilyn Oviedo MD     Procedures: see electronic medical records for all procedures/Xrays and details which were not copied into this note but were reviewed prior to creation of Plan. LABS:  I reviewed today's most current labs and imaging studies.   Pertinent labs include:  Recent Labs      01/04/17 0126 01/03/17 0222 01/02/17   0804   WBC  12.7*  12.0*  12.7*   HGB  11.0*  10.9*  11.0*   HCT  33.0*  33.2*  32.0*   PLT  311  331  318     Recent Labs      01/04/17   0126 01/03/17 0222 01/02/17   0346   NA  149*  145  145   K  3.4*  4.1  4.3   CL  111*  112*  112*   CO2  24  15*  19*   GLU  200*  195*  159*   BUN  49*  52*  47*   CREA  2.31*  2.36*  2.22*   CA  8.6  8.8  9.3   MG  2.2  2.1  2.1   ALB   --   3.3*  3.2*   TBILI   --   0.3  0.3   SGOT   --   39*  33   ALT   --   32  25       Signed: Marilyn Oviedo MD

## 2017-01-04 NOTE — ROUTINE PROCESS
PCU SHIFT NURSING NOTE      Bedside shift change report given to Kulwant (oncoming nurse) by Jean Phoenix (offgoing nurse). Report included the following information SBAR, Kardex, ED Summary, Intake/Output, MAR, Recent Results and Cardiac Rhythm ST. Shift Summary:   5- Assumed care of pt. Pt arousable to voice and pain, appears to be in NAD at this time. Noted bicarb gtt infusing as ordered. Call bell is within reach. Will continue to monitor pt closely. 2000- BP elevated at 192/83, scheduled IV hydralizine given at 2006. Will re-check BP in 30 mins. 2025 - BP re-checked 191/65, scheduled IV lopressor given. Will re-check BP in 30 mins. 2100 - BP re-checked 164/84 - scheduled clonidine patch placed to R arm. Will continue to monitor pt closely. 0335- HR sustaining in the 160's for about 10 minutes. Notified MD on call. Order received to give 1 time dose of IV cardizem 10mg - /93     0415 - BP is 134/58, HR 81, pt asleep and in no distress at this time. Will continue to monitor pt closely. 0710- Effective handoff given to Gopi Lucero            Admission Date 1/1/2017   Admission Diagnosis CVA (cerebral vascular accident) ([facility] [address]   Consults IP CONSULT TO NEUROLOGY  IP CONSULT TO PALLIATIVE CARE - PROVIDER        Consults   [x]PT   [x]OT   [x]Speech   []Case Management      [] Palliative      Cardiac Monitoring Order   [x]Yes   []No     IV drips   [x]Yes    Drip: Na bicarb gtt                       Dose: 150 MEQ @ 84ml/hr  Drip:                            Dose:  Drip:                            Dose:   []No     GI Prophylaxis   []Yes   [x]No         DVT Prophylaxis   SCDs:             Omero stockings:         [x] Medication   []Contraindicated   []None      Activity Level Activity Level: Bed Rest     Activity Assistance: Complete care   Purposeful Rounding every 1-2 hour?    [x]Yes   San Score  Total Score: 2   Bed Alarm (If score 3 or >)   [x]Yes   [] Refused (See signed refusal form in chart)   Antione Score  Antione Score: 12   Antione Score (if score 14 or less)   []PMT consult   []Wound Care consult      []Specialty bed   [] Nutrition consult          Needs prior to discharge:   Home O2 required:    []Yes   [x]No    If yes, how much O2 required? Other:    Last Bowel Movement: Last Bowel Movement Date: 01/01/17 (per chart)      Influenza Vaccine Received Flu Vaccine for Current Season (usually Sept-March): Yes        Pneumonia Vaccine           Diet Active Orders   Diet    DIET NPO      LDAs               Peripheral IV 01/01/17 Right Hand (Active)   Site Assessment Clean, dry, & intact 1/3/2017  8:00 PM   Phlebitis Assessment 0 1/3/2017  8:00 PM   Infiltration Assessment 0 1/3/2017  8:00 PM   Dressing Status Clean, dry, & intact 1/3/2017  8:00 PM   Dressing Type Tape;Transparent 1/3/2017  8:00 PM   Hub Color/Line Status Blue; Infusing 1/3/2017  8:00 PM                      Urinary Catheter      Intake & Output   Date 01/02/17 1900 - 01/03/17 0659 01/03/17 0700 - 01/04/17 0659   Shift 9201-2784 24 Hour Total 3714-3607 1079-9390 24 Hour Total   I  N  T  A  K  E   P. O.  0         P. O.  0       I.V.  (mL/kg/hr)  582.5 574  (0.6)  574      Volume (0.9% sodium chloride infusion)  310         Volume (0.9% sodium chloride infusion)  272.5         Volume (sodium bicarbonate (8.4%) 150 mEq in dextrose 5% 850 mL infusion)   574  574    Shift Total  (mL/kg)  582.5  (6.7) 574  (6.6)  574  (6.6)   O  U  T  P  U  T   Urine  (mL/kg/hr)           Urine Occurrence(s)  2 x 2 x  2 x    Shift Total  (mL/kg)        NET  582.5 574  574   Weight (kg) 86.6 86.6 86.6 86.6 86.6         Readmission Risk Assessment Tool Score High Risk            44       Total Score        3 Relationship with PCP    4 More than 1 Admission in calendar year    9 Patient Insurance is Medicare, Medicaid or Self Pay    28 Charlson Comorbidity Score        Criteria that do not apply:    Patient Living Status    Patient Length of Stay > 5 Expected Length of Stay 4d 12h   Actual Length of Stay 2

## 2017-01-04 NOTE — PROGRESS NOTES
Neurology Progress Note    Patient ID:  Jennifer Adams  [id number]  80 y.o.  12/14/1930    Subjective:      Patient has no complaints because she is aphasic. Patient about the same, basically nonverbal, and weak on the right side and aphasic. MRI scan shows left posterior infarct as expected of moderate size, but no major mass-effect or shift or edema and carotid Dopplers are pending. Talk to family members, and we will have family conference in a.m. Continue antiplatelet therapy for now. No new change, no new symptoms from patient. Current Facility-Administered Medications   Medication Dose Route Frequency    sodium bicarbonate (8.4%) 150 mEq in dextrose 5% 850 mL infusion   IntraVENous CONTINUOUS    metoprolol (LOPRESSOR) injection 5 mg  5 mg IntraVENous Q6H    nitroglycerin (NITROBID) 2 % ointment 1 Inch  1 Inch Topical Q6H PRN    cloNIDine (CATAPRES) 0.3 mg/24 hr patch 1 Patch  1 Patch TransDERmal Q7D    insulin glargine (LANTUS) injection 18 Units  18 Units SubCUTAneous QHS    [START ON 1/4/2017] insulin lispro (HUMALOG) injection   SubCUTAneous Q6H    hydrALAZINE (APRESOLINE) 20 mg/mL injection 10 mg  10 mg IntraVENous Q6H    sodium chloride (NS) flush 5-10 mL  5-10 mL IntraVENous Q8H    sodium chloride (NS) flush 5-10 mL  5-10 mL IntraVENous PRN    acetaminophen (TYLENOL) suppository 650 mg  650 mg Rectal Q4H PRN    meperidine (DEMEROL) injection 12.5 mg  12.5 mg IntraVENous Q4H PRN    heparin (porcine) injection 5,000 Units  5,000 Units SubCUTAneous Q12H    glucose chewable tablet 16 g  4 Tab Oral PRN    dextrose (D50W) injection syrg 12.5-25 g  12.5-25 g IntraVENous PRN    glucagon (GLUCAGEN) injection 1 mg  1 mg IntraMUSCular PRN    aspirin (ASA) suppository 300 mg  300 mg Rectal DAILY    LORazepam (ATIVAN) injection 1-2 mg  1-2 mg IntraVENous Q6H PRN        Review of Systems:    Review of systems not obtained due to patient factors.     Objective:     Patient Vitals for the past 8 hrs:   BP Temp Pulse Resp SpO2   01/03/17 2022 191/65 - 94 - -   01/03/17 1959 192/83 99.1 °F (37.3 °C) (!) 106 22 97 %   01/03/17 1450 (!) 172/132 99.8 °F (37.7 °C) (!) 106 24 99 %          01/02 0701 - 01/03 1900  In: 1156.5 [I.V.:1156.5]  Out: -     Lab Review   Recent Results (from the past 24 hour(s))   GLUCOSE, POC    Collection Time: 01/03/17 12:32 AM   Result Value Ref Range    Glucose (POC) 195 (H) 65 - 100 mg/dL    Performed by Sasha Hallman    CBC WITH AUTOMATED DIFF    Collection Time: 01/03/17  2:22 AM   Result Value Ref Range    WBC 12.0 (H) 3.6 - 11.0 K/uL    RBC 3.47 (L) 3.80 - 5.20 M/uL    HGB 10.9 (L) 11.5 - 16.0 g/dL    HCT 33.2 (L) 35.0 - 47.0 %    MCV 95.7 80.0 - 99.0 FL    MCH 31.4 26.0 - 34.0 PG    MCHC 32.8 30.0 - 36.5 g/dL    RDW 14.6 (H) 11.5 - 14.5 %    PLATELET 661 176 - 774 K/uL    NEUTROPHILS 84 (H) 32 - 75 %    LYMPHOCYTES 9 (L) 12 - 49 %    MONOCYTES 7 5 - 13 %    EOSINOPHILS 0 0 - 7 %    BASOPHILS 0 0 - 1 %    ABS. NEUTROPHILS 10.0 (H) 1.8 - 8.0 K/UL    ABS. LYMPHOCYTES 1.1 0.8 - 3.5 K/UL    ABS. MONOCYTES 0.8 0.0 - 1.0 K/UL    ABS. EOSINOPHILS 0.0 0.0 - 0.4 K/UL    ABS. BASOPHILS 0.0 0.0 - 0.1 K/UL   MAGNESIUM    Collection Time: 01/03/17  2:22 AM   Result Value Ref Range    Magnesium 2.1 1.6 - 2.4 mg/dL   METABOLIC PANEL, COMPREHENSIVE    Collection Time: 01/03/17  2:22 AM   Result Value Ref Range    Sodium 145 136 - 145 mmol/L    Potassium 4.1 3.5 - 5.1 mmol/L    Chloride 112 (H) 97 - 108 mmol/L    CO2 15 (LL) 21 - 32 mmol/L    Anion gap 18 (H) 5 - 15 mmol/L    Glucose 195 (H) 65 - 100 mg/dL    BUN 52 (H) 6 - 20 MG/DL    Creatinine 2.36 (H) 0.55 - 1.02 MG/DL    BUN/Creatinine ratio 22 (H) 12 - 20      GFR est AA 24 (L) >60 ml/min/1.73m2    GFR est non-AA 20 (L) >60 ml/min/1.73m2    Calcium 8.8 8.5 - 10.1 MG/DL    Bilirubin, total 0.3 0.2 - 1.0 MG/DL    ALT 32 12 - 78 U/L    AST 39 (H) 15 - 37 U/L    Alk.  phosphatase 92 45 - 117 U/L    Protein, total 7.7 6.4 - 8.2 g/dL Albumin 3.3 (L) 3.5 - 5.0 g/dL    Globulin 4.4 (H) 2.0 - 4.0 g/dL    A-G Ratio 0.8 (L) 1.1 - 2.2     GLUCOSE, POC    Collection Time: 01/03/17  7:11 AM   Result Value Ref Range    Glucose (POC) 196 (H) 65 - 100 mg/dL    Performed by Radha Pereyra, POC    Collection Time: 01/03/17 12:17 PM   Result Value Ref Range    Glucose (POC) 217 (H) 65 - 100 mg/dL    Performed by [address] [place] [address], POC    Collection Time: 01/03/17  5:25 PM   Result Value Ref Range    Glucose (POC) 175 (H) 65 - 100 mg/dL    Performed by Pebbles Yin        Additional comments:I reviewed the patients new imaging test results. MRI        NEUROLOGICAL EXAM:    Appearance: The patient is well developed, well nourished, provides a incoherent history and is aphasic . Mental Status:  recent aphasic, nonverbal, somewhat lethargic, does focus and look at family on the left side . Cranial Nerves:   Dense right homonymous hemianopsia on visual fields. Fundi are poorly seen. RIYA, EOM's full, no nystagmus, no ptosis. Facial sensation is normal. Corneal reflexes are not tested. Facial movement is weak on the right. Hearing is abnormal bilaterally. Palate is midline with normal sternocleidomastoid and trapezius muscles are normal. Tongue is midline. Motor:  5/5 strength in upper and lower proximal and distal muscles on the left, and patient has moderate severe weakness in the right upper extremity, and severe weakness in the right lower extremity with atrophy and muscle wasting secondary to her previous polio. Normal bulk and tone except for the right lower extremity. No fasciculations. Reflexes:   Deep tendon reflexes 1+/4 and symmetrical.  No Babinski or clonus present    Sensory:   Patient withdraws to pain in all extremities, and pin also, cannot do cortical sensory testing . Gait:  Not ambulatory . Tremor:   No tremor noted. Cerebellar:  No cerebellar signs present.    Neurovascular:  Normal heart sounds and regular rhythm, peripheral pulses decreased, and no carotid bruits. Assessment:     Active Problems:    CVA (cerebral vascular accident) ([address] (1/1/2017)      Thrombotic stroke involving left middle cerebral artery ([address] (1/1/2017)      Stenosis of both internal carotid arteries (1/1/2017)      Aphasia due to recent cerebrovascular accident (CVA) (1/1/2017)      Hemiplegia and hemiparesis following cerebral infarction affecting right dominant side ([address] (1/1/2017)      Diabetic peripheral neuropathy associated with type 2 diabetes mellitus ([address] (1/2/2017)      Syncope and collapse (1/2/2017)      Convulsive syncope ([address] (1/2/2017)      Counseling regarding goals of care (1/3/2017)        Plan:     Patient's MRI scan does show moderate sized posterior left parietal occipital infarct, which fits with patient's symptoms. Do the MRI scan personally on the PACS system and her labs  Main problem is can patient swallow, will work with speech and OT and PT. Continue antiplatelet therapy and treating her lipids. She will probably need skilled nursing facility at discharge  Patient remained stable but significantly impaired from her new stroke  We will follow closely, and discuss with family in a.m.       Signed:  Zina Gonzalez MD  1/3/2017  6:50 PM

## 2017-01-04 NOTE — PROGRESS NOTES
PCU SHIFT NURSING NOTE      Bedside and Verbal shift change report given to Matteo Sanchez RN (oncoming nurse) by Emily Balbuena (offgoing nurse). Report included the following information SBAR, Kardex, Intake/Output, Recent Results and Cardiac Rhythm SR/ST. Shift Summary:       Admission Date 1/1/2017   Admission Diagnosis CVA (cerebral vascular accident) ([address]   Consults IP CONSULT TO NEUROLOGY  IP CONSULT TO PALLIATIVE CARE - PROVIDER        Consults   [x]PT   [x]OT   [x]Speech   []Case Management      [x] Palliative      Cardiac Monitoring Order   [x]Yes   []No     IV drips   []Yes    Drip:                            Dose:  Drip:                            Dose:  Drip:                            Dose:   [x]No     GI Prophylaxis   []Yes   [x]No         DVT Prophylaxis   SCDs:             Omero stockings:         [x] Medication   []Contraindicated   []None      Activity Level Activity Level: Bed Rest     Activity Assistance: Complete care   Purposeful Rounding every 1-2 hour? [x]Yes   San Score  Total Score: 2   Bed Alarm (If score 3 or >)   [x]Yes   [] Refused (See signed refusal form in chart)   Antione Score  Antione Score: 11   Antione Score (if score 14 or less)   [x]PMT consult   []Wound Care consult      []Specialty bed   [] Nutrition consult          Needs prior to discharge:   Home O2 required:    []Yes   [x]No    If yes, how much O2 required?     Other:    Last Bowel Movement: Last Bowel Movement Date: 01/01/17 (per chart)      Influenza Vaccine Received Flu Vaccine for Current Season (usually Sept-March): Yes        Pneumonia Vaccine           Diet Active Orders   Diet    DIET NPO      LDAs               Peripheral IV 01/04/17 Right Forearm (Active)   Site Assessment Clean, dry, & intact 1/4/2017  8:15 AM   Phlebitis Assessment 0 1/4/2017  8:15 AM   Infiltration Assessment 0 1/4/2017  8:15 AM   Dressing Status Clean, dry, & intact 1/4/2017  8:15 AM   Dressing Type Transparent;Tape 1/4/2017  8:15 AM   Hub Color/Line Status Blue; Infusing 1/4/2017  8:15 AM                      Urinary Catheter      Intake & Output   Date 01/03/17 0700 - 01/04/17 0659 01/04/17 0700 - 01/05/17 0659   Shift 0700-1859 1900-0659 24 Hour Total 0700-1859 1900-0659 24 Hour Total   I  N  T  A  K  E   P. O.  0 0         P. O.  0 0       I.V.  (mL/kg/hr) 574  (0.6) 1353.8  (1.3) 1927.8  (0.9)         Volume (sodium bicarbonate (8.4%) 150 mEq in dextrose 5% 850 mL infusion) 574 1353.8 1927.8       Shift Total  (mL/kg) 574  (6.6) 1353.8  (15.6) 1927.8  (22.3)      O  U  T  P  U  T   Urine  (mL/kg/hr)            Urine Occurrence(s) 2 x 2 x 4 x 1 x  1 x    Shift Total  (mL/kg)          1353.8 1927.8      Weight (kg) 86.6 86.6 86.6 86.6 86.6 86.6         Readmission Risk Assessment Tool Score High Risk            44       Total Score        3 Relationship with PCP    4 More than 1 Admission in calendar year    9 Patient Insurance is Medicare, Medicaid or Self Pay    28 Charlson Comorbidity Score        Criteria that do not apply:    Patient Living Status    Patient Length of Stay > 5       Expected Length of Stay 4d 12h   Actual Length of Stay 3

## 2017-01-04 NOTE — PROGRESS NOTES
[id number] [facility]  Preliminary Report:  Carotid Duplex Scan    Right:  Moderate plaque noted in the right carotid system. Right ICA velocities suggest less than 50% diameter reduction. Right vertebral artery flow is unable to be visualized. Left:   plaque noted in the left carotid system. Left ICA velocities suggest  diameter reduction. Left vertebral artery flow is . Left side is not visualized due to combative patient. Final report to follow.

## 2017-01-04 NOTE — PROGRESS NOTES
SLP attempted to see patient for evaluation. She had some snoring respirations and unintentional L hand movements. SLP tried to speak to patient on both sides of her body without response. Attempted to palpate neck for secretion management. She pushed hand away. Then she choked on some secretions and coughed weakly. SLP orally suctioned her for moderate amounts of clear secretions x2. Repositioned head more upright. Attempted to assess her eyes, attention, but she resisted with both her eyelids and hands. Then she briefly opened her eyes, but could not focus on SLP. She groaned loudly as SLP started to leave the room. SLP attempted to engage patient again. At this time, patient is responding only to noxious stimuli. She is also having secretion management issues at this time. Spoke with RN. Agree with palliative care consult. Will f/u 1 more time, pending any change in alertness tomorrow: day 5 of admission and family meeting.

## 2017-01-04 NOTE — PROCEDURES
[facility]  *** FINAL REPORT ***    Name: Brigido Friday  MRN: [id number]    Inpatient  : 14 Dec 1930  HIS Order #: [id number]  [id number] [facility] Visit #: [id number]  Date: 2017    TYPE OF TEST: Cerebrovascular Duplex    REASON FOR TEST  Stroke    Right Carotid:-             Proximal               Mid                 Distal  cm/s  Systolic  Diastolic  Systolic  Diastolic  Systolic  Diastolic  CCA:     25.3                                      91.0  Bulb:  ECA:     44.0  ICA:     77.0                 66.0                 79.0  ICA/CCA:  0.8    ICA Stenosis: <50%    Right Vertebral:-  Finding: Not visualized  Sys:  Lachelle:    Right Subclavian: Normal    Left Carotid:-            Proximal                Mid                 Distal  cm/s  Systolic  Diastolic  Systolic  Diastolic  Systolic  Diastolic  CCA:  Bulb:  ECA:  ICA:  ICA/CCA:    ICA Stenosis: Unknown    Left Vertebral:-  Finding:  Sys:  Lachelle:    Left Subclavian:    INTERPRETATION/FINDINGS  PROCEDURE:  Carotid Duplex Examination using B-mode, color and  spectral Doppler of the extracranial cerebrovascular arteries. 1. <50% stenosis in the right internal carotid artery. 2. No significant stenosis in the right external carotid artery. 3. Unable to visualize the right vertebral artery. 4. Normal flow in the right subclavian artery. Plaque Morphology:  1. Heterogeneous plaque in the right ICA. ADDITIONAL COMMENTS    Unable to access left carotid artery system due to patient  uncooperative during exam.    I have personally reviewed the data relevant to the interpretation of  this  study. TECHNOLOGIST: Herve Thomson RVT, RAFI  Signed: 2017 03:19 PM    PHYSICIAN: Lavinia Kehr A. Lucilla Harbor, MD  Signed: 2017 03:22 PM

## 2017-01-04 NOTE — PROGRESS NOTES
Chart review. Palliative care meeting scheduled for 1/5/2017. Pt remains unable to participate with PT/OT- have signed off at this time; to be reordered if appropriate in the future.     Mari Wolfe RN CM  Ext [phone]

## 2017-01-04 NOTE — PROGRESS NOTES
Problem: Pressure Ulcer - Risk of  Goal: *Prevention of pressure ulcer  Outcome: Progressing Towards Goal  Pt is repositioned and turned every 2 hours during hospitalization. Problem: Falls - Risk of  Goal: *Absence of falls  Outcome: Progressing Towards Goal  Pt will be free of falls during hospitalization. All fall precautions in place - bed alarm is on and audible, bed locked and in low position, non skid socks in place, call bell is within reach. Problem: Ischemic Stroke: Day 3  Goal: Nutrition/Diet  Outcome: Not Progressing Towards Goal  Pt remains NPO at this time. Speech to follow.   Goal: Medications  Outcome: Progressing Towards Goal  On SQ heparin  On scheduled lopressor and hydralizine

## 2017-01-04 NOTE — PROGRESS NOTES
Occupational Therapy  Chart reviewed. Pt remains unable to actively participate. Spoke with PT who assess patient earlier this AM. Per PT, pt does not follow commands (0% of simple, one-step commands) and  remains extremely lethargic. Pt presentation has remained unchanged over previous 3 attempts at OT evaluation therefore will complete order. Please re-consult if pt becomes able to activity participate or pending family wishes following palliative meeting scheduled for Thurs. , 1/5/17. Thank you.  Katie Rodriguez MS, OTR/L

## 2017-01-04 NOTE — PROGRESS NOTES
Palliative Medicine   This LCSW attempted supportive visit. No family at bedside. Pt laying in bed and minimally responsive. Pt did move her left hand when SW used wet towel to wipe by the side of her mouth. Plan:  Palliative Medicine (and hospitalist) meeting with Pt's nok/6 daughters for family meeting tomorrow at 3 pm.      Thank you for including Palliative Medicine in Ms. Bass's care.       Eduardo Grant, [address] ([id number])  Work cell: [phone]

## 2017-01-05 NOTE — PROGRESS NOTES
Neurology Progress Note    Patient ID:  Ginger Madrigal  [id number]  80 y.o.  12/14/1930    Subjective:      Patient has no complaints because she is aphasic. Patient about the same, basically nonverbal, and weak on the right side and aphasic. MRI scan shows left posterior infarct as expected of moderate size, but no major mass-effect or shift or edema and carotid Dopplers show no significant stenosis, and EEG shows moderate generalized slowing, more prominent in the left temporal area, but no seizures. Talked to family members and advised them of the significant nature of her stroke  Continue antiplatelet therapy for now. No new change, no new symptoms from patient.     Current Facility-Administered Medications   Medication Dose Route Frequency    dextrose 5 % - 0.45% NaCl infusion  100 mL/hr IntraVENous CONTINUOUS    dilTIAZem (CARDIZEM) injection 10 mg  10 mg IntraVENous ONCE    Followed by   Leandro Dugan dilTIAZem (CARDIZEM) 100 mg in 0.9% sodium chloride (MBP/ADV) 100 mL infusion  0-15 mg/hr IntraVENous TITRATE    insulin glargine (LANTUS) injection 22 Units  22 Units SubCUTAneous QHS    metoprolol (LOPRESSOR) injection 5 mg  5 mg IntraVENous Q6H    nitroglycerin (NITROBID) 2 % ointment 1 Inch  1 Inch Topical Q6H PRN    cloNIDine (CATAPRES) 0.3 mg/24 hr patch 1 Patch  1 Patch TransDERmal Q7D    insulin lispro (HUMALOG) injection   SubCUTAneous Q6H    hydrALAZINE (APRESOLINE) 20 mg/mL injection 10 mg  10 mg IntraVENous Q6H    sodium chloride (NS) flush 5-10 mL  5-10 mL IntraVENous Q8H    sodium chloride (NS) flush 5-10 mL  5-10 mL IntraVENous PRN    acetaminophen (TYLENOL) suppository 650 mg  650 mg Rectal Q4H PRN    heparin (porcine) injection 5,000 Units  5,000 Units SubCUTAneous Q12H    glucose chewable tablet 16 g  4 Tab Oral PRN    dextrose (D50W) injection syrg 12.5-25 g  12.5-25 g IntraVENous PRN    glucagon (GLUCAGEN) injection 1 mg  1 mg IntraMUSCular PRN    aspirin (ASA) suppository 300 mg  300 mg Rectal DAILY    LORazepam (ATIVAN) injection 1-2 mg  1-2 mg IntraVENous Q6H PRN        Review of Systems:    Review of systems not obtained due to patient factors.     Objective:     Patient Vitals for the past 8 hrs:   BP Temp Pulse Resp SpO2   01/04/17 2011 (!) 149/96 98.9 °F (37.2 °C) 73 22 96 %   01/04/17 1627 107/54 98.9 °F (37.2 °C) 89 24 96 %       01/04 1901 - 01/05 0700  In: 1130 [I.V.:1130]  Out: -   01/03 0701 - 01/04 1900  In: 1927.8 [I.V.:1927.8]  Out: -     Lab Review   Recent Results (from the past 24 hour(s))   GLUCOSE, POC    Collection Time: 01/03/17 11:12 PM   Result Value Ref Range    Glucose (POC) 217 (H) 65 - 100 mg/dL    Performed by Javi Molina    METABOLIC PANEL, BASIC    Collection Time: 01/04/17  1:26 AM   Result Value Ref Range    Sodium 149 (H) 136 - 145 mmol/L    Potassium 3.4 (L) 3.5 - 5.1 mmol/L    Chloride 111 (H) 97 - 108 mmol/L    CO2 24 21 - 32 mmol/L    Anion gap 14 5 - 15 mmol/L    Glucose 200 (H) 65 - 100 mg/dL    BUN 49 (H) 6 - 20 MG/DL    Creatinine 2.31 (H) 0.55 - 1.02 MG/DL    BUN/Creatinine ratio 21 (H) 12 - 20      GFR est AA 24 (L) >60 ml/min/1.73m2    GFR est non-AA 20 (L) >60 ml/min/1.73m2    Calcium 8.6 8.5 - 10.1 MG/DL   CBC W/O DIFF    Collection Time: 01/04/17  1:26 AM   Result Value Ref Range    WBC 12.7 (H) 3.6 - 11.0 K/uL    RBC 3.55 (L) 3.80 - 5.20 M/uL    HGB 11.0 (L) 11.5 - 16.0 g/dL    HCT 33.0 (L) 35.0 - 47.0 %    MCV 93.0 80.0 - 99.0 FL    MCH 31.0 26.0 - 34.0 PG    MCHC 33.3 30.0 - 36.5 g/dL    RDW 14.7 (H) 11.5 - 14.5 %    PLATELET 674 915 - 804 K/uL   MAGNESIUM    Collection Time: 01/04/17  1:26 AM   Result Value Ref Range    Magnesium 2.2 1.6 - 2.4 mg/dL   GLUCOSE, POC    Collection Time: 01/04/17  5:24 AM   Result Value Ref Range    Glucose (POC) 205 (H) 65 - 100 mg/dL    Performed by Javi Molina    GLUCOSE, POC    Collection Time: 01/04/17 12:19 PM   Result Value Ref Range    Glucose (POC) 200 (H) 65 - 100 mg/dL    Performed by Kristy Jackie    GLUCOSE, POC    Collection Time: 01/04/17  7:42 PM   Result Value Ref Range    Glucose (POC) 227 (H) 65 - 100 mg/dL    Performed by Georgia Fletcher        Additional comments:I reviewed the patients new imaging test results. MRI        NEUROLOGICAL EXAM:    Appearance: The patient is well developed, well nourished, provides a incoherent history and is aphasic . Mental Status:  recent aphasic, nonverbal, somewhat lethargic, does focus and look at family on the left side . Cranial Nerves:   Dense right homonymous hemianopsia on visual fields. Fundi are poorly seen. RIYA, EOM's full, no nystagmus, no ptosis. Facial sensation is normal. Corneal reflexes are not tested. Facial movement is weak on the right. Hearing is abnormal bilaterally. Palate is midline with normal sternocleidomastoid and trapezius muscles are normal. Tongue is midline. Motor:  5/5 strength in upper and lower proximal and distal muscles on the left, and patient has moderate severe weakness in the right upper extremity, and severe weakness in the right lower extremity with atrophy and muscle wasting secondary to her previous polio. Normal bulk and tone except for the right lower extremity. No fasciculations. Reflexes:   Deep tendon reflexes 1+/4 and symmetrical.  No Babinski or clonus present    Sensory:   Patient withdraws to pain in all extremities, and pin also, cannot do cortical sensory testing . Gait:  Not ambulatory . Tremor:   No tremor noted. Cerebellar:  No cerebellar signs present. Neurovascular:  Normal heart sounds and regular rhythm, peripheral pulses decreased, and no carotid bruits.          Assessment:     Active Problems:    CVA (cerebral vascular accident) ([facility] [address] (1/1/2017)      Thrombotic stroke involving left middle cerebral artery ([address] (1/1/2017)      Stenosis of both internal carotid arteries (1/1/2017)      Aphasia due to recent cerebrovascular accident (CVA) (1/1/2017)      Hemiplegia and hemiparesis following cerebral infarction affecting right dominant side ([facility] [address] (1/1/2017)      Diabetic peripheral neuropathy associated with type 2 diabetes mellitus ([facility] [address] (1/2/2017)      Syncope and collapse (1/2/2017)      Convulsive syncope ([facility] [address] (1/2/2017)      Counseling regarding goals of care (1/3/2017)        Plan:     Patient's MRI scan does show moderate sized posterior left parietal occipital infarct, which fits with patient's symptoms. Reviewed the EEG and Dopplers scan personally on the PACS system and her labs  Main problem is can patient swallow, will work with speech and OT and PT. Continue antiplatelet therapy and treating her lipids.   She will probably need skilled nursing facility at discharge  Patient remained stable but significantly impaired from her new stroke  We will follow as needed for now, call if I can help       Signed:  Dell Babcock MD  1/4/2017  6:50 PM

## 2017-01-05 NOTE — HOSPICE
[facility] RN note:  Consult noted. Reviewed chart. T/C to dtr Di Grimaldo at [phone], message left with 24hr contact information. Will follow up tomorrow to arrange a time to meet. Thank you for the opportunity to care for this pt and family. Please contact hospice at [phone] with any questions or concerns.

## 2017-01-05 NOTE — PROGRESS NOTES
Palliative Medicine   Palliative medicine team ([facility], ALLIE, Chaplain Doll, and this SW) met with Pt's nok/6 daughters and son-in-law/caregiver for goc conversation. Family present: Alpa Becerra, Alesia Sanchez and Beatriz Bojorquez. Pt's daughters are struggling with emotions at Pt's condition post stroke and are having difficulty being their mother's voice in goc conversation. Pt's dtr Hugo Holden) is also a nurse and provided guidance to siblings during the difficulty conversation. Please refer to Zulema Antunez note about medical information, options discussed and comfort measures plan. Family met privately and decided on hospice. Pt has medicare and medicaid. Palmira Walton and Marilia Jeffers have been primary caregivers for over 30 yrs and Pt's care needs have increased since bedbound in April 2016; they both work. They are unable to provide 24/7 care and would prefer to try nursing home. Family would prefer for Pt to be cared for in a hospice inpatient facility; therefore referral to [facility] for hospital evaluation. Pt's granddtr (Dtr of Palmira Walton) is in her early 35s and was 1 yrs old when Pt moved into their house. They have a close relationship. Marianna Mack is in [place] until April and likely will not be able to come to see pt. Plan:  Recommend ongoing anticipatory grief assessments. May be concern for complicated grief but difficult to assess with multiple family members and normal emotions present during difficult conversation. Hospice to evaluate  DNR. DDNR order would need to be completed if Pt is discharged from hospital.    Thank you for including Palliative Medicine in Ms. Bass's care.       Royal Garsia, [address] ([id number])  Work cell: [phone]

## 2017-01-05 NOTE — PROGRESS NOTES
Palliative Medicine Consult  Rd: [phone] ([phone])    Patient Name: Lilia Oneil  YOB: 1930    Date of Initial Consult: 1/3/17  Reason for Consult: Care Decisions  Requesting Provider: Cuco Davidson  Primary Care Physician: Edmundo Escamilla MD      SUMMARY:   Lilia Oneil is a 80y.o. year old with a past history of DM / HTN / CKD / left side stroke with residual right-sided weakness, who was admitted on 1/1/2017 from home with a diagnosis of CVA. Current medical issues leading to Palliative Medicine involvement include: new onset of aphasia, awake, alert, unresponsive. PALLIATIVE DIAGNOSES:   1. Acute encephalopathy in setting of old left sided CVA in 2006:  Head CT neg but clinically has left sided CVA: MRI scan does show moderate sized posterior left parietal occipital infarct, which fits with patient's symptoms  2. Aphasia   3. Urinary incontinence  4. H/o polio with weak right leg  5. CKD stage 3  6. DM  7. Care decisions     PLAN:   1. Met with pt's 7 daughters, 1 SHAYLA,  Palliative  Sharmila and Palliative NI Gregory: discussed pt's current overall condition including remains unresponsive, arrhythmias, renal failure, lack of nutrition. Family asked many appropriate questions. They have requested pt be evaluated for inpatient hospice. Given cardiac instability, pt might qualify. 2. Initial consult note routed to primary continuity provider  3.  Communicated plan of care with: Palliative IDT, RN, Dr. Gagandeep Covarrubias:   [====Goals of Care====]  Code Status: DNR   Patient / family stated goals:    [] Reverse acute illness using all generally accepted medical interventions  [x] Reverse acute illness, with limitations:   [] Comfort care  [] Other:   [====Goals of Care====]      Advance Care Planning 1/1/2017   Patient's Healthcare Decision Maker is: Legal Next of Shine 69   Primary Decision Maker Name Jennifer La and 5 other daughter of Pt   Primary Decision Maker Phone Number [phone]   Primary Decision Maker Relationship to Patient Adult child   Confirm Advance Directive None   Patient Would Like to Complete Advance Directive No   Does the patient have other document types Other (comment)           HISTORY:     History obtained from: daughter    [facility] family reports pt was altered x 10 min after she woke on am of admission. She has residual right sided deficits secondary to previous stroke. Family refused TPA in ED. Prior to admission, pt was able to assist with transfers, feed self, carry normal conversations.      HPI/SUBJECTIVE:    The patient is:   [] Verbal and participatory  [x] Non-participatory due to: remains unresponsive     Clinical Pain Assessment (nonverbal scale for nonverbal patients): Pain: 0  Adult Non-Verbal Pain Assessment    Face  [x] 0   No particular expression or smile  [] 1   Occasional grimace, tearing, frowning, wrinkled forehead  [] 2   Frequent grimace, tearing, frowning, wrinkled forehead    Activity (movement)  [x] 0   Lying quietly, normal position  [] 1   Seeking attention through movement or slow, cautious movement  [] 2   Restless, excessive activity and/or withdrawal reflexes    Guarding  [x] 0   Lying quietly, no positioning of hands over areas of body  [] 1   Splinting areas of the body, tense  [] 2   Rigid, stiff    Physiology (vital signs)  [x] 0   Stable vital signs  [] 1   Change in any of the following: SBP > 20mm Hg; HR > 20/minute  [] 2   Change in any of the following: SBP > 30mm Hg; HR > 25/minute    Respiratory  [x] 0   Baseline RR/SpO2, compliant with ventilator  [] 1   RR > 10 above baseline, or 5% drop SpO2, mild asynchrony with ventilator  [] 2   RR > 20 above baseline, or 10% drop SpO2, asynchrony with ventilator    Total Non-Verbal Pain Score: 0       FUNCTIONAL ASSESSMENT:     Palliative Performance Scale (PPS):  PPS: 10       PSYCHOSOCIAL/SPIRITUAL SCREENING:     Advance Care Planning:  Advance Care Planning 1/1/2017   Patient's Healthcare Decision Maker is: Legal Next of Shine 69   Primary Decision Maker Name Joey Guzman and 5 other daughter of Pt   Primary Decision Maker Phone Number [phone]   Primary Decision Maker Relationship to Patient Adult child   Confirm Advance Directive None   Patient Would Like to Complete Advance Directive No   Does the patient have other document types Other (comment)        Any spiritual / Sikhism concerns:  [] Yes /  [x] No    Caregiver Burnout:  [] Yes /  [x] No /  [] No Caregiver Present      Anticipatory grief assessment:   [x] Normal  / [] Maladaptive       ESAS Anxiety:  unable to assess due to pt factors    ESAS Depression:   unable to assess due to pt factors     REVIEW OF SYSTEMS:     Positive and pertinent negative findings in ROS are noted above in HPI. The following systems were [] reviewed / [x] unable to be reviewed as noted in HPI  Other findings are noted below. Systems: constitutional, ears/nose/mouth/throat, respiratory, gastrointestinal, genitourinary, musculoskeletal, integumentary, neurologic, psychiatric, endocrine. Positive findings noted below. Modified ESAS Completed by: provider           Pain: 0         Anorexia: 0 Dyspnea: 0     Constipation: No              PHYSICAL EXAM:     Wt Readings from Last 3 Encounters:   01/01/17 191 lb (86.6 kg)   05/19/16 191 lb 2.2 oz (86.7 kg)     Blood pressure 158/78, pulse 90, temperature 98.9 °F (37.2 °C), resp. rate 22, height 5' 5\" (1.651 m), weight 191 lb (86.6 kg), SpO2 98 %, not currently breastfeeding.   Pain:  Pain Scale 1: Adult Nonverbal Pain Scale  Pain Intensity 1: 0              Pain Intervention(s) 1: Repositioned  Last bowel movement:     Constitutional: obese, unresponsive  Eyes: pupils equal, anicteric  ENMT: no nasal discharge, moist mucous membranes  Cardiovascular: regular rhythm, distal pulses intact  Respiratory: breathing not labored, symmetric  Gastrointestinal: soft non-tender, +bowel sounds  Musculoskeletal: right UE weakness, right LE contracted  Skin: warm, dry  Neurologic: not following commands, not moving all extremities  Psychiatric: unable to assess due to pt factors     HISTORY:     Active Problems:    CVA (cerebral vascular accident) ([address] (1/1/2017)      Thrombotic stroke involving left middle cerebral artery ([address] (1/1/2017)      Stenosis of both internal carotid arteries (1/1/2017)      Aphasia due to recent cerebrovascular accident (CVA) (1/1/2017)      Hemiplegia and hemiparesis following cerebral infarction affecting right dominant side ([address] (1/1/2017)      Diabetic peripheral neuropathy associated with type 2 diabetes mellitus ([address] (1/2/2017)      Syncope and collapse (1/2/2017)      Convulsive syncope ([address] (1/2/2017)      Counseling regarding goals of care (1/3/2017)      Past Medical History   Diagnosis Date    Chronic kidney disease (CKD) stage G3b/A3, moderately decreased glomerular filtration rate (GFR) between 30-44 mL/min/1.73 square meter and albuminuria creatinine ratio greater than 300 mg/g 4/25/2014    Diabetes ([address]     HTN (hypertension)     Hypercholesterolemia     Need for varicella vaccine 8/15/2014    Physical debility 2/17/2014    Polio     Stroke [facility]) 2006      Past Surgical History   Procedure Laterality Date    Hx hernia repair        Family History   Problem Relation Age of Onset    Stroke Mother     Hypertension Mother     Diabetes Father     Hypertension Father     Stroke Father       History reviewed, no pertinent family history.   Social History   Substance Use Topics    Smoking status: Never Smoker    Smokeless tobacco: Never Used    Alcohol use No     No Known Allergies   Current Facility-Administered Medications   Medication Dose Route Frequency    dilTIAZem (CARDIZEM) injection 10 mg  10 mg IntraVENous ONCE    dextrose 5% - 0.45% NaCl with KCl 20 mEq/L infusion  100 mL/hr IntraVENous CONTINUOUS    dilTIAZem (CARDIZEM) 100 mg in 0.9% sodium chloride (MBP/ADV) 100 mL infusion  0-15 mg/hr IntraVENous TITRATE    insulin glargine (LANTUS) injection 22 Units  22 Units SubCUTAneous QHS    metoprolol (LOPRESSOR) injection 5 mg  5 mg IntraVENous Q6H    nitroglycerin (NITROBID) 2 % ointment 1 Inch  1 Inch Topical Q6H PRN    cloNIDine (CATAPRES) 0.3 mg/24 hr patch 1 Patch  1 Patch TransDERmal Q7D    insulin lispro (HUMALOG) injection   SubCUTAneous Q6H    hydrALAZINE (APRESOLINE) 20 mg/mL injection 10 mg  10 mg IntraVENous Q6H    sodium chloride (NS) flush 5-10 mL  5-10 mL IntraVENous Q8H    sodium chloride (NS) flush 5-10 mL  5-10 mL IntraVENous PRN    acetaminophen (TYLENOL) suppository 650 mg  650 mg Rectal Q4H PRN    heparin (porcine) injection 5,000 Units  5,000 Units SubCUTAneous Q12H    glucose chewable tablet 16 g  4 Tab Oral PRN    dextrose (D50W) injection syrg 12.5-25 g  12.5-25 g IntraVENous PRN    glucagon (GLUCAGEN) injection 1 mg  1 mg IntraMUSCular PRN    aspirin (ASA) suppository 300 mg  300 mg Rectal DAILY    LORazepam (ATIVAN) injection 1-2 mg  1-2 mg IntraVENous Q6H PRN          LAB AND IMAGING FINDINGS:     Lab Results   Component Value Date/Time    WBC 12.5 01/05/2017 02:06 AM    HGB 10.8 01/05/2017 02:06 AM    PLATELET 875 06/99/4055 02:06 AM     Lab Results   Component Value Date/Time    Sodium 149 01/05/2017 02:06 AM    Potassium 3.1 01/05/2017 02:06 AM    Chloride 111 01/05/2017 02:06 AM    CO2 25 01/05/2017 02:06 AM    BUN 52 01/05/2017 02:06 AM    Creatinine 2.24 01/05/2017 02:06 AM    Calcium 8.3 01/05/2017 02:06 AM    Magnesium 2.2 01/04/2017 01:26 AM      Lab Results   Component Value Date/Time    AST 39 01/03/2017 02:22 AM    Alk.  phosphatase 92 01/03/2017 02:22 AM    Protein, total 7.7 01/03/2017 02:22 AM    Albumin 3.3 01/03/2017 02:22 AM    Globulin 4.4 01/03/2017 02:22 AM     Lab Results   Component Value Date/Time    INR 1.1 01/01/2017 08:15 AM    Prothrombin time 10.7 01/01/2017 08:15 AM    aPTT 25.4 01/01/2017 08:15 AM      No results found for: IRON, FE, TIBC, IBCT, PSAT, FERR   No results found for: PH, PCO2, PO2  No components found for: [place]   Lab Results   Component Value Date/Time     01/02/2017 03:46 AM    CK - MB 2.5 01/01/2017 08:15 AM                Total time: 75 min  Counseling / coordination time: 65 min  > 50% counseling / coordination?: yes    Prolonged service was provided for  []30 min   []75 min in face to face time in the presence of the patient. Time Start:   Time End:   Note: this can only be billed with 98967 (initial) or 76713 (follow up). If multiple start / stop times, list each separately.

## 2017-01-05 NOTE — PROGRESS NOTES
Hospitalist Progress Note    NAME: Andres Dias   :  1930   MRN:  [id number]         Assessment / Plan:  Family meeting this afternoon with Palliative Care. I also met seperately with them prior to the meeting and discussed with family my recommendations that Pt transition to hospice care at this point as she remains completely unable to participate in any type of therapy and is not appropriate for po intake. I do not recommend feeding tube. Family has decided hospice care - would like inpatient hospice consult which I have placed. Acute L MCA CVA: +aphasia, inability to follow commands, R sided weakness. Remains unresponsive.  - MRI  with acute, moderate-sized left posterior MCA territory infarction. Background of extensive chronic ischemic changes and areas of prior hemorrhage.  - echo 1/3 with EF 60%. Suboptimal endocardial visualization limits wall motion analysis. There was moderate concentric hypertrophy. - carotid dopplers: <50% stenosis in the right internal carotid artery. Unable to access left carotid artery system due to patient uncooperative during exam.  - appreciate neuro consult  - Pt still not able to participate with PT/OT/speech  Atrial fibrillation with RVR: continues to have intermittent RVR (HR 170s)  - echo 1/3 with normal EF as below  - d/c metoprolol and diltiazem due to goals of care  - d/c telemetry  HTN, benign/essential: controlled  - con't clonidine patch  - off metoprolol and hydralazine IV  - holding home norvasc, losartan-HCTZ due to NPO status  Insulin-dependent DM2 controlled with nephropathy:  HgA1c 7.4, some hyperglycemia  - d/c insulin due to goals of care  HLD: d/c statin  CKD4  H/o polio: be-bound at baseline  Morbid obesity      SurrrogatePatrica Edy [phone] or [facility] [phone]  Code status: DNR  Prophylaxis: none due to goals of care     Subjective:     Chief Complaint / Reason for Physician Visit  Not awake, not following commands. Mild distress. Discussed with RN events overnight. Review of Systems:  Symptom Y/N Comments  Symptom Y/N Comments   Fever/Chills    Chest Pain     Poor Appetite    Edema     Cough    Abdominal Pain     Sputum    Joint Pain     SOB/CRANDALL    Pruritis/Rash     Nausea/vomit    Tolerating PT/OT     Diarrhea    Tolerating Diet     Constipation    Other       Could NOT obtain due to: stroke     Objective:     VITALS:   Last 24hrs VS reviewed since prior progress note. Most recent are:  Patient Vitals for the past 24 hrs:   Temp Pulse Resp BP SpO2   01/05/17 1107 98.9 °F (37.2 °C) 90 22 158/78 98 %   01/05/17 0800 - 83 - (!) 186/157 -   01/05/17 0326 98.7 °F (37.1 °C) 82 20 160/77 96 %   01/05/17 0157 - 82 - (!) 151/91 -   01/05/17 0135 - (!) 174 - (!) 152/107 -   01/04/17 2324 99 °F (37.2 °C) 93 20 170/82 96 %   01/04/17 2011 98.9 °F (37.2 °C) 73 22 (!) 149/96 96 %   01/04/17 1627 98.9 °F (37.2 °C) 89 24 107/54 96 %       Intake/Output Summary (Last 24 hours) at 01/05/17 1250  Last data filed at 01/05/17 1025   Gross per 24 hour   Intake          4751.33 ml   Output                0 ml   Net          4751.33 ml        PHYSICAL EXAM:  General: Obese. Not alert, not cooperative, no acute distress    EENT:  EOMI. Anicteric sclerae. MMM  Resp:  CTA bilaterally, no wheezing or rales. No accessory muscle use  CV:  Regular rhythm,  No edema  GI:  Soft, moderately distended, Non tender.  +Bowel sounds  Neurologic:  Oriented X 0, no speech,   Psych:   No insight. Not anxious nor agitated  Skin:  No rashes.   No jaundice    Reviewed most current lab test results and cultures  YES  Reviewed most current radiology test results   YES  Review and summation of old records today    NO  Reviewed patient's current orders and MAR    YES  PMH/SH reviewed - no change compared to H&P  ________________________________________________________________________  Care Plan discussed with:    Comments   Patient x    Family  x multiple   RN x    Care Manager Consultant  x palliative                     Multidiciplinary team rounds were held today with , nursing, pharmacist and clinical coordinator. Patient's plan of care was discussed; medications were reviewed and discharge planning was addressed. ________________________________________________________________________  Total NON critical care TIME:  35 Minutes    Total CRITICAL CARE TIME Spent:   Minutes non procedure based      Comments   >50% of visit spent in counseling and coordination of care x    ________________________________________________________________________  Nicole Watts MD     Procedures: see electronic medical records for all procedures/Xrays and details which were not copied into this note but were reviewed prior to creation of Plan. LABS:  I reviewed today's most current labs and imaging studies.   Pertinent labs include:  Recent Labs      01/05/17   0206  01/04/17   0126  01/03/17   0222   WBC  12.5*  12.7*  12.0*   HGB  10.8*  11.0*  10.9*   HCT  32.6*  33.0*  33.2*   PLT  271  311  331     Recent Labs      01/05/17   0206  01/04/17   0126  01/03/17   0222   NA  149*  149*  145   K  3.1*  3.4*  4.1   CL  111*  111*  112*   CO2  25  24  15*   GLU  183*  200*  195*   BUN  52*  49*  52*   CREA  2.24*  2.31*  2.36*   CA  8.3*  8.6  8.8   MG   --   2.2  2.1   ALB   --    --   3.3*   TBILI   --    --   0.3   SGOT   --    --   39*   ALT   --    --   32       Signed: Nicole Watts MD

## 2017-01-05 NOTE — PROCEDURES
[address], [address]   EEG       Name:  Jay Hallman   MR#:  [id number]   :  1930   Account #:  [de-identified]    Date of Procedure:  2017   Date of Adm:  2017       CLINICAL INDICATION: The patient is an 51-year-old female with a   history of sudden loss of speech, sudden collapse and sudden   syncopal episode. Possible seizure, possible convulsive syncope. EEG   to rule out seizures, rule out cortical abnormality. EEG CLASSIFICATION: Dysrhythmia grade 2, delta grade 1,   generalized maximum left hemisphere, maximum left temporal.    DESCRIPTION OF THE RECORD: The background of this recording   contains a posteriorly located occipital alpha rhythm of 8 to 9 Hz that is   very poorly seen in the posterior head regions, but somewhat better   formed in the right hemisphere as compared to the left. Throughout the   recording, there was a moderate increase in some generalized 2-6 Hz   activity seen with this being slightly more prominent in the left   hemisphere and slightly more prominent in the left temporal region. There were no clear areas of spike or spike-and-wave discharges   seen. No recorded spells of any type. Photic stimulation was   performed and produced little change in the background recording. Hyperventilation was not performed. The patient did enter states of   sleep with K-complexes and sleep spindles seen in the central head   regions. There was some movement, muscle and electrode artifact   seen throughout the recording but this record was technically very   adequate. INTERPRETATION: This is a moderately abnormal   electroencephalogram due to the moderate generalized slowing seen   most consistent with a diffuse encephalopathy of toxic, metabolic or   degenerative type.  In addition, there was more focal slowing seen in   the left hemisphere and in the temporal region suggesting an area of   cortical disturbance or structural brain lesion seen there. No clear spike   discharges were seen. Clinical correlation recommended.         MD Jane Howell / Janell Zelaya   D:  01/04/2017   22:49   T:  01/05/2017   05:22   Job #:  [id number]

## 2017-01-05 NOTE — PROGRESS NOTES
Speech pathology  Chart reviewed and spoke with RN who reports patient remains inappropriate for PO consideration. Note family meeting this pm at [address] will continue to follow along pending goals of care. Thanks.  Randall Barragan M.S. CCC-SLP

## 2017-01-05 NOTE — PROGRESS NOTES
Nutrition Services      Nutrition Screen:  Wt Readings from Last 10 Encounters:   01/01/17 86.6 kg (191 lb)   05/19/16 86.7 kg (191 lb 2.2 oz)     Body mass index is 31.78 kg/(m^2). Supplements:                        _____ ordered ______  declined. __ __  Pt is nutritionally stable at this time, will rescreen in 7 days. __x__    Pt is at nutritional risk and will be rescreened in 1-5 days. __ __  Pt is at moderate or high nutritional risk, will refer to RD for assessment.        [facility]  Dietetic Technician, Registered

## 2017-01-05 NOTE — PROGRESS NOTES
Cm acknowledged consult for inpatient  Hospice. Referral sent via [facility] to [facility]. WYATT spoke with Sharon at [facility] and confirmed receipt.      Kendal Bender RN CM  Ext [phone]

## 2017-01-05 NOTE — PROGRESS NOTES
Participated in family meeting with pt's six daughters, a son-in-law Naz Skaggs NP Geo Lim and Tomas Delatorre. Family shared their uncertainty regarding plan of care moving forward, struggling with whether or not to consider a PEG tube and what quality of life for pt would look like in the future. They were also wrestling with where pt should receive care - home vs nursing home. Pt is cared for by daughter Krystal Bennett and Carlitos Zimmer in their home. After family spent some time talking privately, family shared with our team their interest in pursuing hospice care. Emotional support offered to family, affirming their love and emotions. Will attempt to follow-up as able/needed for ongoing family support.     Sharmila Gaviria, Palliative

## 2017-01-05 NOTE — PROGRESS NOTES
1:35 AM Entered patient's room d/t patient's HR afib RVR in the 170's on the monitor. Pt.'s /100. 5 mg IV metoprolol given as ordered. Patient's HR sustaining in the 150's post administration. Dr. Glenn Leggett notified and orders received for one time dose of 10 mg IV cardizem. Entered patient's room to administer cardizem and patient's HR now noted to be 68-82 on the monitor. IV cardizem held at this time d/t patient back in NSR with a controlled rate. Will continue to monitor closely.

## 2017-01-05 NOTE — PROGRESS NOTES
Bedside report received from Reuben [facility].

## 2017-01-06 PROBLEM — J96.90 RESPIRATORY FAILURE (HCC): Status: ACTIVE | Noted: 2017-01-01

## 2017-01-06 NOTE — CDMP QUERY
ALEAH Bueno M :  Please clarify if this patient is being treated/managed for:    =>Functional quadriplegia in the setting of bedbound d/t Polio, recurrent stroke, aphasic, complete care, chronic contractures req complete care, bed alarm, bedrest, hospice  =>Other Explanation of clinical findings  =>Unable to Determine (no explanation of clinical findings)    The medical record reflects the following clinical findings, treatment, and risk factors:    Risk Factors: 86F adm w/ recurrent stroke, hx polio  Clinical Indicators: ED--Chronic contractures of the RUE. Atrophy to BLE. 3+ pitting edema to BLE. Non-verbal; does not follow basic commands. 1/6 pn-H/o polio: be-bound at baseline  Treatment: Complete care, bed alarm, bedrest, hospice    Please clarify and document your clinical opinion in the progress notes and discharge summary including the definitive and/or presumptive diagnosis, (suspected or probable), related to the above clinical findings. Please include clinical findings supporting your diagnosis.     Thank Nguyễn Newman

## 2017-01-06 NOTE — HOSPICE
[address] [facility]  [phone]    Inpatient Nursing Admission   Patient Name: Ginger Madrigal  YOB: 1930  Age: 80 y.o.        Date of Hospice Admission: 1/6/2017  Hospice Attending: Yen Valero MD  Primary Care Physician: Gail Castaneda MD  Admitting RN: Dorthula Lundborg RN  : Hernan Verma RN     Level of Care (GIP/Routine/Respite): [facility]  Facility of Care: [facility]  Patient Room: [address] [place] [address]   ER Visits/ Hospitalizations in past year:   5/19/16; Seen in ER for weight loss  1/1/17: Admitted for CVA   Hospice Diagnosis: CVA  Onset Date of Hospice Diagnosis: 1/1/17    Co-Morbidities:   Patient Active Problem List   Diagnosis Code    Poliomyelitis osteopathy of lower leg ([address] M89.669, B91    HTN, goal below 130/80 I10    Diabetes mellitus, type 2 ([address] E11.9    Hypercholesterolemia E78.00    Physical debility R53.81    Chronic kidney disease (CKD) stage G3b/A3, moderately decreased glomerular filtration rate (GFR) between 30-44 mL/min/1.73 square meter and albuminuria creatinine ratio greater than 300 mg/g N18.3    Need for varicella vaccine Z23    CVA (cerebral vascular accident) ([address] I63.9    Thrombotic stroke involving left middle cerebral artery ([address] J15.433    Stenosis of both internal carotid arteries I65.23    Aphasia due to recent cerebrovascular accident (CVA) I69.320    Hemiplegia and hemiparesis following cerebral infarction affecting right dominant side ([address] I69.351    Diabetic peripheral neuropathy associated with type 2 diabetes mellitus ([address] E11.42    Syncope and collapse R55    Convulsive syncope ([address] R55    Counseling regarding goals of care Z71.89    Respiratory failure ([address] J96.90     Diagnoses RELATED to the terminal prognosis: Respiratory Failure, aphasia, hemiplegia, hemiparesis, HTN,   Other Diagnoses:     Rationale for a prognosis of life expectancy of 6 months or less if the disease follows its normal course (Disease Specific History): Heather Goldberg is a 80 y.o. who was admitted to [facility]. The patient's principle diagnosis of CVA has resulted in her most recent hospital stay and over all decline. She was brought in to the ER with right sided weakness and diagnosed with a CVA, family refused TPA. She was admitted to the hospital and has continued to decline. She is not a candidate for gastric tube placement or aggressive care due to her cardiac issues and renal funtion. Functionally, the patient's Palliative Performance Scale has declined over a period of 5 days and is estimated at 20. Objective information that support this patients limited prognosis includes: hemiplegia, aphasia. WBC12.5, RBC 3.50, Hgb 10.8, Hct 32.6, , K 3.1, Chloride 111, BUN 52, Cr 2.24. The patient/family chose comfort measures with the support of Hospice. Prognosis estimated based on 01/06/17 clinical assessment is:   [] Few to Many Hours  [] Hours to Days   [] Few to Many Days   [x] Days to Weeks   [] Few to Many Weeks   [] Weeks to Months   [] Few to Many Months    ADVANCE CARE PLANNING (Complete in ACP Flow Sheet)   Code Status: DNR  Durable DNR: _ Yes  X_ No  Advance Care Planning 1/1/2017   Patient's Healthcare Decision Maker is: Legal Next of [address] 69   Primary Decision Maker Name Greg Norris and 5 other daughter of Pt   Primary Decision Maker Phone Number [phone]   Primary Decision Maker Relationship to Patient Adult child   Confirm Advance Directive None   Patient Would Like to Complete Advance Directive No   Does the patient have other document types Other (comment)        Service: [] Yes  [x]  No      [] Unknown  Appropriate for Pinning Ceremony:  [] Yes     [x] No  Sikh: NO PREFERENCE    ASSESSMENT    Quality Measure: Patient self-reports:  []  Yes    [x] No    SYMPTOMS: Pt is experiencing aphasia. She is experiencing increased anxiety, agitation, and restlessness.  She is grabbing at her children, once she gets their hands she is squeezing very tightly and bending back their fingers. She is moaning, and is having increased respirations. When she becomes restless she slides down in the bed and she cannot reposition herself. ESAS:   Time of Assessment: 1200  Pain (1-10): 6/10  Shortness of breath (1-10): 6/10  Nausea (1-10): 0/10  Constipation: []  Yes [x] No    FALL RISK:  [] Low   [] Moderate    [x] High    [] Not able to assess    KARNOFSKY: 20    PRESSURE ULCERS   Antione Scale (pressure ulcer risk): 6    FAST for all dementia:     Progression to DEPENDENCE WITH ADLs (include time frame): 5 days  [x] Dependent for bathing: personal hygiene and grooming   [x] Dependent for dressing: dressing and undressing   [x] Dependent for transferring: movement and mobility   [x] Dependent for toileting: continence-related tasks including control and hygiene   [x] Dependent for eating: preparing food and feeding    CLINICAL INFORMATION   Mid-arm Circumference (MAC):        Wt Readings from Last 3 Encounters:   01/01/17 86.6 kg (191 lb)   05/19/16 86.7 kg (191 lb 2.2 oz)     There is no height or weight on file to calculate BMI. There were no vitals taken for this visit. Currently this patient has:  [x] Supplemental O2 [x] Peripheral IV  [] PICC    [] PORT   [x] Talavera Catheter [] NG Tube   [] PEG Tube [] Ostomy    [] AICD: Has ICD been deactivated? [] Yes [] No:______    SIGNS/PHYSICAL FINDINGS: Pt is experiencing edema in bilateral hands and legs, she is unable to speak, she is combative at times     LAB VALUES  No results found for this visit on 01/06/17 (from the past 12 hour(s)). No results found for this visit on 01/06/17 (from the past 6 hour(s)).   Lab Results   Component Value Date/Time    Protein, total 7.7 01/03/2017 02:22 AM    Albumin 3.3 01/03/2017 02:22 AM       ALLERGIES AND MEDICATIONS     Allergies: No Known Allergies  Current Facility-Administered Medications   Medication Dose Route Frequency  LORazepam (INTENSOL) 2 mg/mL oral concentrate 0.5 mg  0.5 mg Oral Q6H    LORazepam (ATIVAN) injection 0.5 mg  0.5 mg IntraVENous Q1H PRN    glycopyrrolate (ROBINUL) injection 0.2 mg  0.2 mg IntraVENous Q4H PRN    bisacodyl (DULCOLAX) suppository 10 mg  10 mg Rectal DAILY PRN    morphine (ROXANOL) 100 mg/5 mL (20 mg/mL) concentrated solution 5 mg  5 mg SubLINGual Q1H PRN       ASSESSMENT & PLAN     1. Admit to hospice inpatient. Pt is to start scheduled ativan sublingual with a PRN IV dose if she needs it to help control her agitation. Once the pt has become managed with medication and is calm the plan is for either a discharge to home with hospice. CAREGIVER SUPPORT:  [x] Provided information on End of Life Care   [] Material Provided: Gone From My Sight or [facility]     1. FALL INTERVENTIONS PROVIDED (if the fall risk is >10,  the intervention below was provided)  [x] Implement/recommend assistive devices and encouraged their use (use full bed rails, etc)  [x] Implemented/recommended resources for alar, system (personal alarm, bed alarm, call bell, etc)  [x] Implemented/recommended environmental changes (remove hazards, lower bed, improper lighting, etc.)  [x] Implemented/recommended increased supervision/assistance  [] Scheduled PT and/or OT evaluation for mobility/transfer techniques and/or assistive device recommendations    2. Initial Bereavement POC  [] LOW RISK Indications: normal grief, good support, opens expression. [] MODERATE RISK Indications: grief normal but severe, depression, somatic distress, low support  [x] HIGH RISK indications: Hx of mental illness, suicidal ideation, depression, somatic distress, excessive guilt or anger, multiple losses, other life crisis. 3. Oxygen SAFETY and Anticoagulation SAFETY: Only if being transferred to home environment    4.  Copies of Election of Benefit and Hospice Consent:  [x] Hospice Folder Provided  [x] See Electronic Health Records for past medical records    5. Spiritual Issues Identified: None     6. Psych/ Social/ Emotional Issues Identified: None    7. Care Coordination:   Dr. Sheeba Harrington contacted for medication reconciliation, hospice services and treatment  MEDS: See medication list above  DME: Per hospital/hospice house  Supplies: Per hospital/hospice house  [facility]: [address], [place], [address] ([phone]. Hospice Team Orders  [x] Skilled Nurse -  7X week, DAILY (daily/every other day) while admitted to hospital / hospice house  [x] MSW  1 visit and then prn for assessment/evaluation for family support and need for volunteer services  [x]   1 visit and then prn for assessment/evaluation for spiritual support.   services will contact / Clinical Manager for further orders  [] Hospice Aide to be evaluated by  (if patient being transferred to home environment)

## 2017-01-06 NOTE — H&P
[address] [facility] Help to Those in Need  [phone]    Patient Name: Abhishek Harrison  YOB: 1930    Date of Provider Hospice Visit: 01/06/17    Level of Care:   [x] General Inpatient (GIP)    [] Routine   [] Respite    Location of Care:  [] [facility] [] [address] [x] [facility] [] [facility] [] Hospice House [facility]  [] Home [] Other:         HOSPICE NARRATIVE COMPOSED BY PHYSICIAN   Rationale for a prognosis of life expectancy of 6 months or less if the disease follows its normal course:  Isabel Fam is a 80y.o. year old who was admitted to [facility]. The patient's principle diagnosis of Acute CVA has resulted in aphasia, right hemiplegia decreased mental status and hospitalization since 1-1-17. Admitted for AMS and aphasia. Has acute left MCA CVA by MRI. No TPA> Pt has been NPO, not candidate for feeding tube. Pt has remained restless and agitated with some moaning. Has had  Functionally, the patient's Palliative Performance Scale has declined over a period of days and is estimated at 20. Objective information that support this patients limited prognosis includes: Brain MRI 1-2-17 IMPRESSION  IMPRESSION:  Acute, moderate-sized left posterior MCA territory infarction. Background of  extensive chronic ischemic changes and areas of prior hemorrhage as above.     . The patient/family chose comfort measures with the support of Hospice.          HOSPICE DIAGNOSES   Active Symptoms:  1. Agitation  2. Restlessness  3. Excess secretions       PLAN   1. Admit to hospice GIP for restlessness and agitation  2. Start sl ativan 0.5 mg q6 hrs and q1 hrs prn  3. roxanol prn  4. Robinul prn  5. NPO except sl medicines  6. Clonidine patch for HTN    7.  and SW to support family needs  8. Disposition: to LTC/SNF with hospice when symptoms are managed.      Prognosis estimated based on 01/06/17 clinical assessment is:   [] Few to Many Hours  [] Few to Many Days    [x] Few to Many Weeks    [] Few to Many Months    Communicated plan of care with: Hospice Case Manager;  Hospice IDT; Care Team     GOALS OF CARE     Resuscitation Status: DNR  Durable DNR: [] Yes [] No    Advance Care Planning 1/1/2017   Patient's Healthcare Decision Maker is: Legal Next of [address] 69   Primary Decision Maker Name Bria Lehman and 5 other daughter of Pt   Primary Decision Maker Phone Number [phone]   Primary Decision Maker Relationship to Patient Adult child   Confirm Advance Directive None   Patient Would Like to Complete Advance Directive No   Does the patient have other document types Other (comment)        HISTORY     History obtained from: chart and hospice RN    CHIEF COMPLAINT:   The patient is:   [] Verbal  [x] Nonverbal  [] Unresponsive    HPI/SUBJECTIVE:         REVIEW OF SYSTEMS     The following systems were: [] reviewed  [x] unable to be reviewed    Positive ROS include:  Constitutional:  Ears/nose/mouth/throat:  Respiratory:  Gastrointestinal:  Musculoskeletal:  Neurologic:  Psychiatric:  Endocrine:     Adult Non-Verbal Pain Assessment Score: 4    Face  [] 0   No particular expression or smile  [] 1   Occasional grimace, tearing, frowning, wrinkled forehead  [x] 2   Frequent grimace, tearing, frowning, wrinkled forehead    Activity (movement)  [] 0   Lying quietly, normal position  [] 1   Seeking attention through movement or slow, cautious movement  [x] 2   Restless, excessive activity and/or withdrawal reflexes    Guarding  [] 0   Lying quietly, no positioning of hands over areas of body  [] 1   Splinting areas of the body, tense  [] 2   Rigid, stiff    Physiology (vital signs)  [x] 0   Stable vital signs  [] 1   Change in any of the following: SBP > 20mm Hg; HR > 20/minute  [] 2   Change in any of the following: SBP > 30mm Hg; HR > 25/minute    Respiratory  [x] 0   Baseline RR/SpO2, compliant with ventilator  [] 1   RR > 10 above baseline, or 5% drop SpO2, mild asynchrony with ventilator  [] 2   RR > 20 above baseline, or 10% drop SpO2, asynchrony with ventilator     FUNCTIONAL ASSESSMENT     Palliative Performance Scale (PPS):20     PSYCHOSOCIAL/SPIRITUAL ASSESSMENT     Active Problems:    Respiratory failure ([facility] (1/6/2017)      Past Medical History   Diagnosis Date    Chronic kidney disease (CKD) stage G3b/A3, moderately decreased glomerular filtration rate (GFR) between 30-44 mL/min/1.73 square meter and albuminuria creatinine ratio greater than 300 mg/g 4/25/2014    Diabetes ([facility]     HTN (hypertension)     Hypercholesterolemia     Need for varicella vaccine 8/15/2014    Physical debility 2/17/2014    Polio     Stroke ([facility] 2006      Past Surgical History   Procedure Laterality Date    Hx hernia repair        Social History   Substance Use Topics    Smoking status: Never Smoker    Smokeless tobacco: Never Used    Alcohol use No     Family History   Problem Relation Age of Onset    Stroke Mother     Hypertension Mother     Diabetes Father     Hypertension Father     Stroke Father       No Known Allergies   Current Facility-Administered Medications   Medication Dose Route Frequency    LORazepam (INTENSOL) 2 mg/mL oral concentrate 0.5 mg  0.5 mg Oral Q6H    LORazepam (ATIVAN) injection 0.5 mg  0.5 mg IntraVENous Q1H PRN    glycopyrrolate (ROBINUL) injection 0.2 mg  0.2 mg IntraVENous Q4H PRN    bisacodyl (DULCOLAX) suppository 10 mg  10 mg Rectal DAILY PRN    morphine (ROXANOL) 100 mg/5 mL (20 mg/mL) concentrated solution 5 mg  5 mg SubLINGual Q1H PRN    acetaminophen (TYLENOL) suppository 650 mg  650 mg Rectal Q4H PRN    cloNIDine (CATAPRES) 0.3 mg/24 hr patch 1 Patch  1 Patch TransDERmal Q7D        PHYSICAL EXAM     Wt Readings from Last 3 Encounters:   01/01/17 191 lb (86.6 kg)   05/19/16 191 lb 2.2 oz (86.7 kg)       There were no vitals taken for this visit.     Supplemental O2  [] Yes  [] NO  Last bowel movement:     Currently this patient has:  [x] Peripheral IV [] PICC  [] PORT [] ICD    [] Talavera Catheter [] NG Tube   [] PEG Tube    [] Rectal Tube [] Drain  [] Other:     Constitutional: pt is aphasic, not able to follow commands, very restless grabbing with left hand. Eyes: perrl  ENMT: clear  Cardiovascular: rrr  Respiratory: clear  Gastrointestinal: soft obese  Musculoskeletal:flaccid right arm and leg. Able to move leg and withdraws to pain on left. Grabbing with left hand  Skin: warm, dry  Neurologic: alert , eyes open , not tracking or following commands  Psychiatric:   Other:    Pertinent Lab and or Imaging Tests:  Lab Results   Component Value Date/Time    Sodium 149 01/05/2017 02:06 AM    Potassium 3.1 01/05/2017 02:06 AM    Chloride 111 01/05/2017 02:06 AM    CO2 25 01/05/2017 02:06 AM    Anion gap 13 01/05/2017 02:06 AM    Glucose 183 01/05/2017 02:06 AM    BUN 52 01/05/2017 02:06 AM    Creatinine 2.24 01/05/2017 02:06 AM    BUN/Creatinine ratio 23 01/05/2017 02:06 AM    GFR est AA 25 01/05/2017 02:06 AM    GFR est non-AA 21 01/05/2017 02:06 AM    Calcium 8.3 01/05/2017 02:06 AM     Lab Results   Component Value Date/Time    Protein, total 7.7 01/03/2017 02:22 AM    Albumin 3.3 01/03/2017 02:22 AM           Total time: 40 min  Counseling / coordination time: d/w hospice RN  > 50% counseling / coordination? :n

## 2017-01-06 NOTE — CERTIFICATE OF TERMINAL ILLNESS
Hospice Physician Admission Narrative   (Certification of Terminal Illness)    [address] [place] [address]  [facility]  [phone]    Inna Polanco    Date of Hospice Admission: 1-6-17  Hospice Attending: Jairon Munoz MD    Principle Hospice Diagnosis: Acute CVA  Diagnoses RELATED to the terminal prognosis: HTN, debility, polio ( bedbound). ckd 3  Other Diagnoses: hx cva with right weakness, DM-2       HOSPICE NARRATIVE COMPOSED BY PHYSICIAN   Rationale for a prognosis of life expectancy of 6 months or less if the disease follows its normal course: Inna Polanco is a 80y.o. year old who was admitted to [address] [place] [address]. The patient's principle diagnosis of Acute CVA has resulted in aphasia, right hemiplegia  decreased mental status and hospitalization since 1-1-17. Admitted for AMS and aphasia. Has acute left MCA CVA by MRI. No TPA> Pt has been NPO, not candidate for feeding tube. Pt has remained restless and agitated with some moaning. Has had  Functionally, the patient's Palliative Performance Scale has declined over a period of days and is estimated at 20. Objective information that support this patients limited prognosis includes: Brain MRI 1-2-17 IMPRESSION  IMPRESSION:  Acute, moderate-sized left posterior MCA territory infarction. Background of  extensive chronic ischemic changes and areas of prior hemorrhage as above. .  The patient/family chose comfort measures with the support of Hospice. Attestation: I confirm that I composed this narrative as the physician and is based on my review of the patient's medical record and/or examination of the patient. ______________________________________________________________________    SmartPhrase LCD for Hospice Diagnosis (.hospicecriteria. ..)    The LCD for Hospice for the admitted diagnosis of stroke/coma icludes:    Stroke:    [x] Karnofsky Performance Status (KPS) or Palliative Performance Scale (PPS) of 40% or less    Inability to maintain hydration and caloric intake with one of the following:  [] Weight loss >10% in the last 6 months or >7.5% in the last 3 months  [] Serum albumin <2.5 gm/dl  [] Current history of pulmonary aspiration not responsive to speech language pathology intervention  [] Sequential calorie counts documenting inadequate caloric/fluid intake  [x] Dysphagia severe enough to prevent the patient from receiving food and fluids necessary to sustain life, in a patient who declines or does not receive artificial nutrition and hydration. Coma (any etiology):     Comatose patients with any 3 of the following on day three of coma:  [] abnormal brain stem response;  [] absent verbal response;  [] absent withdrawal response to pain;  [] serum creatinine >1.5 mg/dl. Documentation of the following factors will support eligibility for hospice care:    Documentation of medical complications, in the context of progressive clinical decline, within the previous 12 months, which support a terminal prognosis:  [] Aspiration pneumonia  [] Upper urinary tract infection (pyelonephritis)  [] Sepsis  [] Refractory stage 3-4 decubitus ulcers  [] Fever recurrent after antibiotics    Documentation of diagnostic imaging factors which support poor prognosis after stroke include: For non-traumatic hemorrhagic stroke:    [] Large-volume hemorrhage on CT:  [] Infratentorial: ?20 ml  [] Supratentorial: ?50 ml  [] Ventricular extension of hemorrhage  [] Surface area of involvement of hemorrhage ? 30% of cerebrum  [] Midline shift ?1.5 cm  [] Obstructive hydrocephalus in patient who declines, or is not a candidate for, ventriculoperitoneal shunt     For thrombotic/embolic stroke:    [] Large anterior infarcts with both cortical and subcortical involvement  [] Large bihemispheric infarcts  [] Basilar artery occlusion  [] Bilateral vertebral artery occlusion

## 2017-01-06 NOTE — ROUTINE PROCESS
Bedside and Verbal shift change report given to [address] (oncoming nurse) by ARVIN Wells RN (offgoing nurse). Report given with SBAR, Kardex, Intake/Output, MAR and Recent Results.

## 2017-01-06 NOTE — DISCHARGE SUMMARY
Hospitalist Discharge Summary     Patient ID:  Flex Wright  [id number]  80 y.o.  12/14/1930    PCP on record: Jose J Smith MD    Admit date: 1/1/2017  Discharge date and time: 1/6/2017      DISCHARGE DIAGNOSIS:  Acute L MCA CVA  Atrial fibrillation with RVR  HTN, benign/essential  Insulin-dependent DM2 controlled with nephropathy  HLD  CKD4  H/o polio  Morbid obesity      CONSULTATIONS:  IP CONSULT TO NEUROLOGY  IP CONSULT TO PALLIATIVE CARE - PROVIDER    Excerpted HPI from H&P of Peter Saunders MD:  Flex Wright is a 80 y.o. female, pmhx DM / HTN / CKD / stroke, who presents via EMS to the ED for evaluation of AMS x this morning. Per ems pt found in bed responding to painful stimuli only per family pt is not ambulatory any longer ?old va since June. EMS reports a hx of R sided deficits secondary to a previous stroke. pt had a glucose level of 920, systolic BP in the 089X, and HR in the 90s en route to the ED. Family reports no c/p,sob abdominal pain, n/v fevers or chills no reports of uti.no h/o headaches. Non smoker/non drinker. pt family pt was in her usual state of health last night.     We were asked to admit for work up and evaluation of the above problems. ______________________________________________________________________  DISCHARGE SUMMARY/HOSPITAL COURSE:  for full details see H&P, daily progress notes, labs, consult notes. Hospital course:  Acute L MCA CVA: +aphasia, inability to follow commands, R sided weakness. Remains unresponsive. MRI 1/2 with acute, moderate-sized left posterior MCA territory infarction. Background of extensive chronic ischemic changes and areas of prior hemorrhage. Echo 1/3 with EF 60%. Suboptimal endocardial visualization limits wall motion analysis. There was moderate concentric hypertrophy. Carotid dopplers: <50% stenosis in the right internal carotid artery.  Unable to access left carotid artery system due to patient uncooperative during exam.  Pt remained unable to participate with PT/OT/speech. Family meetings were held and it was decided that she would transition to inpatient hospice. Atrial fibrillation with RVR: continues to have intermittent RVR (HR 170s). Echo 1/3 with normal EF as below. She is off metoprolol and diltiazem due to goals of care. HTN, benign/essential: controlled. Pt should con't clonidine patch. Insulin-dependent DM2 controlled with nephropathy:  HgA1c 7.4, some hyperglycemia. Off insulin due to goals of care  HLD: d/c statin  CKD4  H/o polio: be-bound at baseline  Morbid obesity    _______________________________________________________________________  Patient seen and examined by me on discharge day. Pertinent Findings:  Gen: not awake, not appropriate, mild distress with gurgling  HEENT: cl stephanie, no lesions, EOM not intact  Chest: CTA bilaterally, no crackles or wheezes  Cv: RRR, no murmur, no edema  Abd: soft, NT, moderately distended, BS+, no mass  Neuro: CN not intact. Unable to participate for neuro exam due to stroke.  _______________________________________________________________________  DISCHARGE MEDICATIONS:   Current Discharge Medication List      CONTINUE these medications which have NOT CHANGED    Details   cloNIDine HCl (CATAPRES) 0.3 mg tablet Take 0.3 mg by mouth two (2) times a day. insulin detemir (LEVEMIR FLEXPEN) 100 unit/mL (3 mL) inpn 38 Units by SubCUTAneous route Before breakfast and dinner. cholecalciferol, VITAMIN D3, (VITAMIN D3) 5,000 unit tab tablet Take 5,000 Units by mouth daily. calcium carbonate (OS-FRANKIE) 500 mg calcium (1,250 mg) tablet Take 1 Tab by mouth daily. losartan-hydroCHLOROthiazide (HYZAAR) 50-12.5 mg per tablet Take 1 Tab by mouth daily. aspirin delayed-release 81 mg tablet Take  by mouth daily.       amLODIPine (NORVASC) 5 mg tablet Take 1.5 tabs daily  Qty: 45 Tab, Refills: 3    Associated Diagnoses: HTN, goal below 130/80      insulin aspart (Carol Kelly) 100 unit/mL flexpen 12 units before breakfast, 12 units before lunch and 12 units before dinner  Qty: 3 Each, Refills: 11    Associated Diagnoses: Diabetes mellitus, type 2 ([address]             My Recommended Diet, Activity, Wound Care, and follow-up labs are listed in the patient's Discharge Insturctions which I have personally completed and reviewed.     _______________________________________________________________________  DISPOSITION:    Home with Family:    Home with HH/PT/OT/RN:    SNF/LTC:    MACIE:    OTHER: x       Condition at Discharge:  Unstable  _______________________________________________________________________  Follow up with:   PCP : Lisa Infante MD  Follow-up Information     Follow up With Details Comments [id number] Jonathan Cline MD                 Total time in minutes spent coordinating this discharge (includes going over instructions, follow-up, prescriptions, and preparing report for sign off to her PCP) :  35 minutes    Signed:  Abbi Araujo MD

## 2017-01-06 NOTE — PROGRESS NOTES
Accompanied Palliative Sw to pt's room. Pt was awake but unable to interact due to her condition. Provided words of comfort and assurance to pt. Palliative Sw reached out to pt's daughter Basil Tamayo by phone to inquire about Palliative team providing a handprint keepsake for Julissa's [place]. Basil Tamayo agreed and this  assisted Sw in the making of the handprint. Family plans to meet with hospice today to discuss pt's care moving forward. Chaplains are available for continued support to family as able/needed.     Sharmila Frederick, Palliative

## 2017-01-06 NOTE — HOSPICE
[facility] RN Note    Met with family in regards to hospice. Discussed hospice services in the hospital, at home, and in a nursing facility. Pt has medicaid so that is an option. Family is meeting and discussing and will call and notify hospice nurse what the decision is.      Thank you for the opportunity to serve this patient and family     [facility]   [phone]

## 2017-01-06 NOTE — PROGRESS NOTES
Palliative Medicine   This LCSW and Palliative  visited Pt for support/follow up. No family at bedside. LCSW called Pt's dtr/cg Cdaen Ruvalcaba) at home number then alternative #, [phone]. Recognizing granddtr not being present for Pt's EOL, SW offered handprint keepsake to Pt's dtr and she was in agreement. SW will provide handprint keepsake late this afternoon to Jennifer Torres for her dtr. Plan:  Pt's nok/6 dtrs plan to meet with [facility] today. DNR. Would need DDNR order if discharged from hospital.    Thank you for including Palliative Medicine in Mrs. Bass's care.       Taylor Todd, [address] ([id number])  Work cell: [phone]

## 2017-01-06 NOTE — HOSPICE
[facility] LCSW Note:  Chioma Ibarra RN met with patient and family to explain hospice services services and philosophy. This LCSW and Chioma Ibarra RN met with family again later to sign consents for hospice GIP admission for patient for her CVA and management of symptoms of agitation and restlessness. Once symptoms are managed and if patient is stable, family and hospice team will come up with a plan for discharge. Patient does have Medicaid and could go back home with her sister with personal care or be placed at a Nursing Home. A detailed admission note will follow.

## 2017-01-06 NOTE — HOSPICE
[facility] RN Note     Called number in chart for daughter. Received her cell phone number from family member. [phone]. Meeting set up at 12 noon with the family to discuss hospice services.      Thank you for the opportunity to serve this pt and her family    [facility]   [phone]

## 2017-01-07 NOTE — ROUTINE PROCESS
Oncology Nursing Communication Tool      7:07 AM  1/7/2017     Bedside and Verbal shift change report given to Prema Ybarra RN (incoming nurse) by Michelet Olvera RN (outgoing nurse) on Guy Walker a 80 y.o. female who was admitted on 1/6/2017  3:25 PM. Report included the following information SBAR, Kardex and MAR. Significant changes during shift: none      Issues for physician to address: none            Code Status: DNR     Infections: No current active infections     Allergies: Review of patient's allergies indicates no known allergies. Current diet: DIET NPO       Pain Controlled [x] yes [] no   Bowel Movement [x] yes [] no   Last Bowel Movement (date)     1/7/17            Vital Signs:   Patient Vitals for the past 12 hrs:   Temp Pulse Resp BP SpO2   01/07/17 0515 97.8 °F (36.6 °C) 82 18 133/72 92 %      Intake & Output:     Intake/Output Summary (Last 24 hours) at 01/07/17 0707  Last data filed at 01/06/17 2000   Gross per 24 hour   Intake                0 ml   Output              250 ml   Net             -250 ml      Laboratory Results:   No results found for this or any previous visit (from the past 12 hour(s)). Opportunity for questions and clarifications were given to the incoming nurse. Patient's bed is in low position, side rails x2, door open PRN, call bell within reach and patient not in distress.       Michelet Olvera, RN

## 2017-01-07 NOTE — PROGRESS NOTES
Oncology Nursing Communication Tool      7:49 PM  1/6/2017     Bedside shift change report given to Annabel Nelson RN (incoming nurse) by Trell Gupta (outgoing nurse) on Josue Tipton a 80 y.o. female who was admitted on 1/6/2017  3:25 PM. Report included the following information SBAR, Kardex, Intake/Output, MAR and Recent Results. Significant changes during shift:       Issues for physician to address:             Code Status: DNR     Infections: No current active infections     Allergies: Review of patient's allergies indicates no known allergies. Current diet: DIET NPO       Pain Controlled [] yes [] no   Bowel Movement [] yes [] no   Last Bowel Movement (date)                 Vital Signs:   No data found. Intake & Output:     Intake/Output Summary (Last 24 hours) at 01/06/17 1949  Last data filed at 01/06/17 1850   Gross per 24 hour   Intake                0 ml   Output                0 ml   Net                0 ml      Laboratory Results:   No results found for this or any previous visit (from the past 12 hour(s)). Opportunity for questions and clarifications were given to the incoming nurse. Patient's bed is in low position, side rails x2, door open PRN, call bell within reach and patient not in distress.       Trell Gupta

## 2017-01-07 NOTE — HOSPICE
[facility]  [phone]    Social Work Admission Note  Patient Name: Magda Dee  YOB: 1930  Age: 80 y.o. Date of Visit: 01/06/17  Facility of Care: [facility]  Patient Room: 1112/01     Hospice Attending: Valentín Chris MD  Hospice Diagnosis: hospice  Respiratory failure [facility])    Level of Care:    [x]  GIP    []  Respite   []  Routine    NARRATIVE   This 80year old  woman was admitted for [facility] hospice services for management of her symptoms of restlessness, agitation and combativeness at times following a CVA and hospital admission. She has right hemiplagia, aphasia and is unable to eat. She is a heavy set woman who lived with her daughter Shyanne Mujica in [address] [facility] [address] [facility]. Patient had home health aides who assisted at home. She has lived with her daughter for 30 years. Unsure of details of her personal history. The family had already met with hospice RN and were in the midst of signing paperwork. Will talk to family to get to know patient as able. Patient has six daughters. Four of them were present today. Family aware that if patient stabilizes then she would have to return home or to a NH. Reviewed chart. Patient has a history of Polio and used a cane and then wheelchair. She had Dr. Dayton Maloney from a home visit program visit her for medical needs. Patient has eyes open, is restless at times, moans sometimes and does not seem comfortable.      ADVANCE CARE PLANNING    Code Status: DNR  Durable DNR: _ Yes  X_ No  Advance Care Planning 1/1/2017   Patient's Healthcare Decision Maker is: Legal Next of Kin   Primary Decision Maker Name Shyanne Mujica and 5 other daughter of Pt   Primary Decision Maker Phone Number [phone]   Primary Decision Maker Relationship to Patient Adult child   Confirm Advance Directive None   Patient Would Like to Complete Advance Directive No   Does the patient have other document types Other (comment)       Relationship Status:  []  Single     []        []      []  Domestic Partner     []  /  []  Common Law  []    [x]  Unknown    If in a relationship, name of partner/spouse:  Duration of relationship:    Hoahaoism: Unsure   Home: [facility] home in [address] [facility] Provided:  None at this time     Social Work Initial Assessment     Gender:  female    Race/Ethnicity: (bienvenido all that apply)  []  [place] [facility] or [place] Native  []    [x]  Black or [place] American  []   or   []  [address] [facility] or [facility]  []  [place]  []  Unknown      Service:    []  Yes   [x]  No       []  Unknown  Appropriate for Pinning Ceremony:   []  Yes      []  No  Is patient using VA benefits?    []  Yes      []  No     Primary Language: English  []   Needed  []   utilized during visit    Ability to express thoughts/needs/feelings  []  Expressed thoughts/feelings/needs without difficulty  []  Requires extra time and cuing  []  Speech limited single words  []  Uses only gestures (eye, blinking eye or head movement/pointing)  [x]  Unable to express thoughts/feelings/needs (speech unintelligible or inappropriate)  []  Unresponsive  Notes: Not verbal at this time, cannot make needs known.     Mental Status:  []  Alert-oriented to:     []  Person     []  Place     []  Time  []  Comatose-responds to:    []   Verbal stimuli    []  Tactile stimuli    []  Painful stimuli  []  Forgetful  []  Disoriented/Confused  [x]  Lethargic  [x]  Agitated  []  Other (specify):    Notes: GIP for management of these symptoms     Patients description of Illness/Current Health Status:    [x]  Patient unable to discuss  []  Patient unwilling to discuss  []  (Specify)        Knowledge/Understanding of Disease Process  Patient:    []  Demonstrates knowledge/understanding of disease process   []  Demonstrates knowledge/understanding of treatment plan   []  Demonstrates knowledge/understanding of prognosis   []  Demonstrates acceptance of prognosis   []  Demonstrates knowledge/understanding of resuscitation status   []  Other (specify)  Caregiver:   [x]  Demonstrates knowledge/understanding of disease process   [x]  Demonstrates knowledge/understanding of treatment plan   [x]  Demonstrates knowledge/understanding of prognosis   [x]  Demonstrates acceptance of prognosis   [x]  Demonstrates knowledge/understanding of resuscitation status   []  Other (specify)  Notes:      Patients living arrangement/care setting:  Use the PRIOR COLUMN when the PATIENTS current health status necessitated a change in his/her primary residence. Prior Current Response              []             []    Patients own home/residence              [x]             []    Home of family member/friend              []             []    Boarding home              []             []    Assisted living facility/correction center              []             []    Hospital/Acute care facility              []             []    Skilled nursing facility              []             []    Long term care facility/Nursing home              []             [x]    Hospice in Patient      Primary Caregiver:  []  No Primary Caregiver  Name of Primary Caregiver: Beverly Benz  Relationship or Primary Caregiver:    []  Spouse/Significant other       [x]  Natural Child        []  Step child       []  Sibling   []  Parent   []  Friend/Neighbor   []  Community/Episcopal Volunteer   []  Paid help   []  Other (specify):___________  Notes:  Patient has 6 daughters     Family members/Significant others:  Name: Beverly Benz  Relationship: Daughter  Phone Number: [phone]  Actively involved in care? [x]  Yes  []  No    Name: Elsy Sidhu  Relationship: Daughter  Phone Number: [phone]  Actively involved in care? [x]  Yes  []  No    Name: Trice Brooks  Relationship: Daughter  Phone Number:  Actively involved in care?   [x]  Yes  [] No    Social support systems: (select ONE best description)  [x]  Excellent social support system which includes three or more family members or friends  []  Good social support system which includes two or less members or friends  []  [address] support which includes one family member or friend  []  Poor social support; no family members or friends; basically ALONE  Notes:      Emotional Status: (bienvenido all that apply)    Patient Caregiver Response                 []                [x]    Mood/Affect stable and appropriate                   []                []    Angry                 []                []    Anxious                 []                []    Apprehensive                 []                []    Avoidant                 []                []    Clinging                 []                []    Depressed                 []                []    Distraught                 []                []    Elated                 []                []    Euphoric                 []                []    Fearful                 []                []    Flat Affect                 []                []    Helpless                 []                []    Hostile                 []                []    Impulsive                 []                []    Irritable                 []                []    Labile                 []                []    Manic                 [x]                []    Restlessness                 []                []    Sad                 []                []    Suspicious                 []                []    Tearful                 []                []    Withdrawn     Notes:     Coping Skills (strengths/weakness):    Patient: Coping Skills (strength/weakness): Not sure what patient understands   Family/caregiver (strength/weakness):  Accepting     [place] of care (bienvenido all that apply):     [x]  No burden evident   []  Family must administer medications   []  Illness causing financial strain   [] Family/Support feels overwhelmed   []  Family/Support sleep disturbed with patients care   []  Patients care causes extra physical stress  of death  []  Illness causes changes in family lifestyle  []  Illness impacting family/support employment  []  Family experiencing increased time demands  []  Patients behavior endangers family  []  Denial of patients illness  []  Concern over outcome of illness/fear  []  Patients behavior embarrassing to family   Notes:      Risk Factors: (bienvenido all that apply):    [x]  No burden evident   []  Alcohol abuse  []  Financial resources inadequate to meet basic needs (food/house/etc)  []  Financial resources inadequate to meet health care needs (supplies/equipment/medications)  []  Food/nutrition resources inadequate  []  Home environment unsafe/inadequate for home care  []  Homicidal risk  []  Lives alone or without concerned relatives  []  Multiple medications/complex schedule  []  Physical limitations increase likelihood of falls  []  Plan of care/treatments complicated  []  Substance use/abuse  []  Suicidal risk  []  Visual impairment threatens safety/ability to perform self-care  []  Other (specify):     Abuse/Neglect (actual/potential risks):  [x]  No signs of abuse/neglect  []  History of abuse/neglect                 []  [id number]          []  Sexual  []  History of domestic violence  []  Lacks adequate physical care  []  Lacks emotional nurturing/support  []  Lacks appropriate stimulation/cognitive experiences  []  Left alone inappropriately  []  Lacks necessary supervision  []  Inadequate or delayed medical care  []  Unsafe environment (i.e guns/drug use/history of violence in the home/etc.)  []  Bruising or other physical signs of injury present  []  Other (specify):  Notes:   []  Refer to child/adult protective services      Current Sources of Stress (in Addition to Current Illness):   [x]  None reported  []  Bills/Debt    []  Career/Job change    []   (short term)    []   (long term)    []  Death of a child (recent)    []  Death of a parent (recent)   []  Death of a spouse (recent)   []  Employment status changed   []  Family discord    []  Financial loss/Inadequate inther (specify):come  []  Job loss  []  Legal issues unresolved  []  Lifestyle change  []  Marital discord  []  Marriage within the last year  []  Paperwork (insurance/legal/etc) overwhelming  []  Separation/Divorce  []  Other (specify):  Notes:      Current [employer] Being Utilized     1. Has Medicaid and had Long term personal care        Interventions/Plan of Care     1. Assess social and emotional factors related to coping with end of life issues  2. Community resource planning/referral   3. Relocation to different care setting if/when symptoms stabilize  4. Discharge Planning     1. Will assist as needed.  Family aware of need for plans if patient were to stabilize    Hospice Bereavement Assessment     1/6/2017    Bereaved Name: Danitza Kim  Address: [address] [place] [address]  Phone: [phone]  Bereaved Date of Birth  Bereaved Gender:  [x]  Female  []  Male  Date Assessment Completed: 01/07/17    Relationship to the Patient:  []  Spouse/Significant other    []  Parent  [x]  Natural child      []  Step child   []  friend/Neighbor  []  Sibling  []  Other (specify):     Primary Caregiver  [x]  Yes    []  No     Social Support Systems  [x]  Excellent social support system which includes three or more willing family members or friends  []  Good social support system which includes two or less willing family members or friends  []  [address] support which includes one willing family member or friend  []  Poor social support; no willing family members or friends; basically ALONE     Frequency of Contact/Communication with Family and Friends  [x]  Daily  []  Three or more times a week  []  One to two times a week  []  Two to three times per month  []  At least monthly  []  Less often than monthly     Community Support Groups/Counseling Services Currently Used:   [x]  None  []  Bereavement support group   Specify support group:   []  Behavioral Support group   Specify support group:   []  Cancer support group    Specify support group:   []  Mental health support/counseling  Specify support group:   []  Other (specify)     Sources of Stress/Grief in Addition to Patients Current illness or Death:    [x]  None reported  []  Bills/Debt    []  Career/Job change     []   (short term)  []   (long term)     []  Death of child      []  Death of parent    []  Death of spouse   []  Employment status changed     []  Family discord     []  Financial      []  Financial loss/Inadequate income  []  Job loss  []  Lifestyle change  []  Marital discord  []  Marriage within the last year  []  Separation/Divorce. []  Legal issues unresolved  []  Paperwork (insurance/legal/etc.) overwhelming  []  Personal illness/injury  []  Other (specify):    Stress Level Reported   [] 1      [] 2      [x] 3      [] 4      [] 5     [] 6      [] 7      [] 8      [] 9      [] 10  []  No Stress         []  Maximum Stress     Support Notes: Patient Jigar Willoughby daughter for 30 years. Lately daughter had become overwhelmed with patient's care.     Emotional Behavior  Emotional Status:    [x]  NO SIGNIFICANT FINDINGS  []  Agitation/Restless      []  Angry       []  Anxious     []  Avoidant       []  Bereavement /loss issues     []  Clinging     []  Depressed       []  Distraught  []  Euphoric   []  Fearful   []  Flat affect  []  Helpless  []  Hostile   []  Irritable  []  Labile  []  Potential suicide risk  []  Sad  []  Strained family/social relationships  []  Suspicious  []  Tearful  []  Withdrawn         Coping of Caregiver: Check coping mechanisms observed during assessment  []  Confrontive coping (describes aggressive efforts to alter the situation and suggests some degree of hostility and risk taking)  []  Distancing (describes cognitive efforts to detach oneself and to minimize the significance of the situation)  []  Self-Controlling 9describes efforts to regulate ones own feelings)  []  Social Support (describes efforts to seek informational support, tangible support and emotional support)  [x]  Accepting (acknowledges ones own role an doing inner work that promotes personal peace)  []  Resignation  surrender (to accept no longer fight, to make peace w/circumstances)  []  Escape - Avoidance- Denial (describes wishful thinking and behavioral efforts to escape or avoid)  []  Planful Problem Solving (describes deliberates [id number] efforts to alter the situation)  []  Positive Reappraisal (describes efforts to create positive meaning by focusing on personal growth.  It also has a Catholic dimension)  []  Other:     Significant behavior changed noted:   [x]  None  []  Alcohol use/abuse  []  Arrest or problems with law enforcement  []  Emotional lability  []  Increased incidence of physical illness/symptoms  []  Loss of interest in activities  []  School performance affected   []  Sleeping patters/habits altered  []  Substance/Drug use and/or abuse  []  Smoking (underage or excessive)  []  Truancy  []  Other:      Emotional/Behavior Notes:    []  Suicidal Ideation Expressed/Discussed :  None  []  Homicidal Ideation Expressed/Discussed: None         Coping skills (strengths/weaknesses): Melissa, support of her sisters     Support Services Needed/Referrals Required: Phi Yin all that apply)     Suggested Resources  []         []  Financial management/counseling  []  Final arrangements/ planning  []  Food/Nutrition resources  []  Home maintenance/repairs/ services  []  Homemaker services  []  Income/Medical coverage assistance  []  Legal Assistance   []  Mental health referral   []  Protective services  []  Relocation to different care setting  []  Transportation   []  Other:       Bereavement Follow-Up Plan: Regular mailings, ask how other sisters are doing. They may be relieved to some degree as patient lived with this daughter for 30 years.      Financial  [x]  Independent: Manages financial affairs without assistance  []  Minimal Assistance: Needs prompting/reminders to pay bills/make deposits/cash checks or manage accounts  []  Moderate Assistance: Needs supervision of all financial tasks  []  Total Assistance: Unable to manage her/his own financial affairs  []  Unknown  Notes     Survivor Risk Assessment Summary (Actual or Potential Risks to the Bereavement Process):     []  Abuse or history of abuse observed or reported within family system  []  Concurrent stressful events or situations observed or reported  []  Dependent children reside within household  []  Family discord exhibited/described  []  Feelings of guilt expressed by family members  []  Financial status significantly affected by illness/death  []  Marital discord exhibited/described  []  Neglect observed or reported within the family system  []  Patient is a single-parent with dependent children  []  Psychiatric illness (or history) reported  []  Social Support systems limited  []  Spiritual distress observed or reported  []  Survivor frail/elderly/dependent  []  Survivor showing emotional and/or physical signs of stress/distress  []  Other (specify):  Notes:     Cultural Perspective Regarding Death:    []  Yes    [x]  No  Cultural Notes     Bereavement Assessment:     [x]  LOW RISK Indications:    [x]  normal grief   [x]  good support    [x]  open expression      []  MODERATE RISK Indications:    []  grief normal but severe    []  depression (taking meds. professional help)   []  somatic distress   []  low support    []  Hx of difficulty w/ loss    []  unresolved conflict w/ the .  Alejandro [place]  []  HIGH RISK Indications:    []  Hx mental illness    []  Suicidal ideation    []  Depression (no meds.  or prof. help)   []  Somatic distress   []  Excessive guilt or anger    []  Multiple losses    []  unresolved losses , other life crises.        MSW Assessment Completed by: EMELIA Gary  01/06/17  Time In:  2:15 PM - 3:00 PM     Time Out

## 2017-01-07 NOTE — HOSPICE
[facility] RN Note     Chart reviewed. Spoke with nurse Tomas Branham RN. Reviewed patient status. Due to inclement weather, hospice nurse visit unable to be made today. Staff aware to call with issues. Spoke with Shanell Godwin NP to notify of patient status and order to continue current LOC. Verbal order received for bass insertion.      [facility]   [phone]

## 2017-01-07 NOTE — PROGRESS NOTES
Oncology Nursing Communication Tool          1/7/2017     Bedside shift change report given to Boris Levy RN (incoming nurse) by Tristen Howard (outgoing nurse) on Heather Goldberg a 80 y.o. female who was admitted on 1/6/2017  3:25 PM. Report included the following information SBAR, Kardex, Intake/Output, MAR, Accordion and Recent Results. Significant changes during shift: Talavera catheter placed; patient tolerated well. Issues for physician to address: None. Thank you. Code Status: DNR     Infections: No current active infections     Allergies: Review of patient's allergies indicates no known allergies. Current diet: DIET NPO       Pain Controlled [x] yes [] no   Bowel Movement [x] yes [] no   Last Bowel Movement (date)    1/07/2017            Vital Signs:   No data found. Intake & Output:     Intake/Output Summary (Last 24 hours) at 01/07/17 1804  Last data filed at 01/07/17 1700   Gross per 24 hour   Intake                0 ml   Output              550 ml   Net             -550 ml      Laboratory Results:   No results found for this or any previous visit (from the past 12 hour(s)). Opportunity for questions and clarifications were given to the incoming nurse. Patient's bed is in low position, side rails x2, door open PRN, call bell within reach and patient not in distress.       Tristen Howard

## 2017-01-07 NOTE — PROGRESS NOTES
Talavera catheter inserted using sterile technique. Patient medicated prior to insertion. Catheter was placed on first attempt; 300 milliliters of pale yellow, cloudy urine drained immediately into collection bag.

## 2017-01-08 NOTE — ROUTINE PROCESS
Oncology Nursing Communication Tool      8:12 AM  1/8/2017     Bedside shift change report given to Ivette Cleaning RN (incoming nurse) by Lucy Deng RN (outgoing nurse) on Buster Shoulder a 80 y.o. female who was admitted on 1/6/2017  3:25 PM. Report included the following information SBAR, Kardex, Intake/Output and MAR. Significant changes during shift: pt opens eyes & at times has some spontaneous, purposeful movement of left arm/hand rubbing her face & pushing nurses hand away during mouth care. Issues for physician to address: iv leaking. Needs atropine gtts for secretions if iv not resited. Code Status: DNR     Infections: No current active infections     Allergies: Review of patient's allergies indicates no known allergies. Current diet: DIET NPO       Pain Controlled [x] yes [] no   Bowel Movement [] yes [x] no   Last Bowel Movement (date) 1/7/17            Vital Signs:   Patient Vitals for the past 12 hrs:   Temp Pulse Resp BP SpO2   01/08/17 0517 97.7 °F (36.5 °C) 88 20 182/81 90 %      Intake & Output:     Intake/Output Summary (Last 24 hours) at 01/08/17 8101  Last data filed at 01/08/17 0531   Gross per 24 hour   Intake                0 ml   Output              475 ml   Net             -475 ml      Laboratory Results:   No results found for this or any previous visit (from the past 12 hour(s)). Opportunity for questions and clarifications were given to the incoming nurse. Patient's bed is in low position, side rails x2, door open PRN, call bell within reach and patient not in distress.       Lucy Deng RN

## 2017-01-08 NOTE — HOSPICE
[facility]  [phone]    GIP Daily Nursing Note   Patient Name: Gaudencio Garcia  YOB: 1930  Age: 80 y.o. Date of Visit: 01/08/17  Facility of Care: [facility]  Patient Room: 1112/01     Hospice Attending: Amanda Flores MD  Hospice Diagnosis: hospice  Respiratory failure [facility])    Level of Care: GIP  GIP Symptoms: agitation, restlessness    Current GIP Symptoms    1. Pt remains restless, agitated, at times a little combative. She continues to have moments when she moans and attempts to express herself, however since her stroke she is unable to speak. She is more comfortable since the medications have been ordered for her. She was seen today by Doroteo Mccollum NP and medications were adjusted b/c at the time of her assessment she was restless with heavy breathing        ASSESSMENT & PLAN   Must update Plan of Care including visit frequencies for IDT members  1. Continue to monitor the pt's symptoms and her comfort level. Provide resources to make sure she remains comfortable. Spiritual Interventions:     Psych/ Social/ Emotional Interventions:     Care Coordination Needs: Family will need hep with dispo once the pt is comfortable     Care plan and New Orders discussed / approved with Doroteo Mccollum NP. Description History and Chart Review   If this is initial GIP note must document RN assessment/MD communication in previous setting. Specifically document nursing/medication needs in last 24 hours to support GIP care  Narrative History of last 24 hours that demonstrates care cannot be provided in another setting:  Pt has been requiring medication adjustments, she remains agitated and restless. She us completely bed bound, incontinent. She has required a bass be placed due to urinary retention. What has been done to control the patient's symptoms in the last 24 hours? Bass placement, medication changes    Does the patient currently require IV medications?  yes  Does the patient currently require scheduled medications? yes  Does the patient currently require a PCA? no    List number of doses of PRN medications in last 24 hours:  Medication 1: Robinul 0.2mg   Number of doses: 1@ 0109    Medication 2: Lorazepam 0.5mg IV   Number of doses: 1 /7/16  1 @ 7576, 1 @ 0801    Medication 3:   Number of doses:    Supporting documentation for GIP need for pain control:  [x] Frequent evaluation by a doctor, nurse practitioner, nurse   [x] Frequent medication adjustment    [x] IVs that cannot be administered at home   [] Aggressive pain management   [] Complicated technical delivery of medications              Supporting documentation for GIP need for symptom control:  []  Sudden decline necessitating intensive nursing intervention  []  Uncontrolled / intractable nausea or vomiting   []  Pathological fractures  []  Advanced open wounds requiring frequent skilled care  [] Unmanageable respiratory distress  [] New or worsening delirium   [] Delirium with behavior issues: Is 24 hour caregiver present due to safety concerns with agitation? (yes/no)  [] Imminent death  with skilled nursing needs documented above    DISCHARGE PLANNING   Daily discharge planning required for GIP  1. Discharge Plan: home with hospice   2. Patient/Family teaching:   3. Response to patient/family teaching:     ASSESSMENT    KARNOFSKY: 30    Prognosis estimated based on 01/08/17 clinical assessment is:   [] Few to Many Hours  [] Hours to Days   [] Few to Many Days   [x] Days to Weeks   [] Few to Many Weeks   [] Weeks to Months   [] Few to Many Months    Quality Measure: Patient self-reports:  [] Yes    [x] No    ESAS:   Time of Assessment: 1430  Pain (1-10): 0  Fatigue (1-10): 0  Shortness of breath (1-10): 0  Nausea (1-10): Appetite (1-10):    Anxiety: (1-10):   Depression: (1-10):   Well-being: (1-10):   Constipation: _ Yes  _ No  LAST BM:     CLINICAL INFORMATION   Patient Vitals for the past 12 hrs:   Temp Pulse Resp BP SpO2   01/08/17 1519 98.6 °F (37 °C) 91 22 (!) 209/97 92 %       Currently this patient has:  [x] Supplemental O2   [x] IV    [] PICC      [] PORT   [] NG Tube    [] PEG Tube   [] Ostomy     [x] Talavera draining _amber with sediment______ urine  [] Other:     SIGNS/PHYSICAL FINDINGS     Skin (including wound):  [] Warm, dry, supple, intact and color normal for race  [x] Warm   [] Dry   [] Cool     [x] Clammy       [] Diaphoretic    Turgor   [] Normal   [x] Decreased  Color:   [x] Pink   [] Pale   [] Cyanotic   [] Erythema   [] Jaundice   [] Normal for Race  []  Wounds:    Neuro:  [] Lethargy  [x] Restlessness / agitation  [] Confusion / delirium  [] Hallucinations  [] Responds to maximal stimulation  [] Unresponsive  [] Seizures     Cardiac:  [] Dyspnea on Exertion  [] JVD  [] Murmur  [] Palpitations  [] Hypotension  [x] Hypertension  [] Tachycardia  [] Bradycardia  [] Irregular HR  [] Pulses Decreased  [] Pulses Absent  [] Edema: anasarca    (Location, Grade and Pitting)  [] Mottling:      (Location)    Respiratory:  Breath sounds:    [x] Diminished   [] Wheeze   [] Rhonchi   [] Rales   [x] Even and unlabored  [] Labored:            [] Cough   [] Non Productive   [] Productive    [] Description:           [] Deep suctioned   [x] O2 at _2__ LPM  [] High flow oxygen greater than 10 LPM  [] Bi-Pap    GI  [] Abdomen (describe)   [] Ascites  [] Nausea  [] Vomiting  [x] Incontinent of bowels  [] Bowel sounds (yes/no)  [] Diarrhea  [] Constipation (see above including last bowel movement)  [] Checked for impaction  [] Last BM     Nutrition  Diet:__NPO________  Appetite:   [] Good   [] Fair   [] Poor   [] Tube Feeding       [] Voiding  [x] Incontinent   [x] Talavera    Musculoskeletal  [] Balance/[place] Unsteady   [x] Weak   Strength:    [] Normal    [] Limited    [] Decreasing   Activities:    [] Up as tolerated   [] Bedridden    [] Specify:    SAFETY  [x] 24 hr. Caregiver   [x] Side rails ?     [x] Hospital bed   [] Reviewed Falls & Safety       ALLERGIES AND MEDICATIONS     Allergies: No Known Allergies    Current Facility-Administered Medications   Medication Dose Route Frequency    atropine 1 % ophthalmic solution 1 Drop  1 Drop SubLINGual Q4H PRN    morphine (ROXANOL) 100 mg/5 mL (20 mg/mL) concentrated solution 5 mg  5 mg SubLINGual Q2H    LORazepam (INTENSOL) 2 mg/mL oral concentrate 0.5 mg  0.5 mg Oral Q6H    LORazepam (ATIVAN) injection 0.5 mg  0.5 mg IntraVENous Q1H PRN    glycopyrrolate (ROBINUL) injection 0.2 mg  0.2 mg IntraVENous Q4H PRN    bisacodyl (DULCOLAX) suppository 10 mg  10 mg Rectal DAILY PRN    acetaminophen (TYLENOL) suppository 650 mg  650 mg Rectal Q4H PRN    cloNIDine (CATAPRES) 0.3 mg/24 hr patch 1 Patch  1 Patch TransDERmal Q7D          Visit Time In: 1430  Visit Time Out: 6640

## 2017-01-08 NOTE — PROGRESS NOTES
[facility]  [phone]    Patient Name: Ruthy Schlatter  YOB: 1930    Date of Provider Hospice Visit: 01/08/17    Level of Care:   [x] General Inpatient (GIP)    [] Routine   [] Respite    Location of Care:  [] [facility] [] [address] [place] [x] [facility] [] [facility] [] [facility]  [] Home [] Other:      Date of Hospice Admission: 1-6-17  Hospice Attending: Rohan Sanchez MD    Principle Hospice Diagnosis: Acute CVA  Diagnoses RELATED to the terminal prognosis: HTN, debility, polio ( bedbound). ckd 3  Other Diagnoses: hx cva with right weakness, DM-2             HOSPICE NARRATIVE COMPOSED BY PHYSICIAN   Rationale for a prognosis of life expectancy of 6 months or less if the disease follows its normal course:  Caridad López is a 80y.o. year old who was admitted to [address] [place] [address]. The patient's principle diagnosis of Acute CVA has resulted in aphasia, right hemiplegia decreased mental status and hospitalization since 1-1-17. Admitted for AMS and aphasia. Has acute left MCA CVA by MRI. No TPA> Pt has been NPO, not candidate for feeding tube. Pt has remained restless and agitated with some moaning. Has had Functionally, the patient's Palliative Performance Scale has declined over a period of days and is estimated at 20. Objective information that support this patients limited prognosis includes: Brain MRI 1-2-17    IMPRESSION:  Acute, moderate-sized left posterior MCA territory infarction. Background of  extensive chronic ischemic changes and areas of prior hemorrhage as above.      The patient/family chose comfort measures with the support of Hospice.               HOSPICE DIAGNOSES   Active Symptoms:  1. Agitation  2. Restlessness  3. Excess secretions  4. Moribund, actively dying  5. Increased shortness of breath and work of breathing       PLAN   1.  Continue  hospice GIP for restlessness and agitation  2. continue sl ativan 0.5 mg q6 hrs and q1 hrs prn  3. roxanol 5mg scheduled q 2 hrs  4. Robinul prn  5. NPO except sl medicines  6. Clonidine patch for HTN     7.  and SW to support family needs  8. Disposition: to LTC/SNF with hospice when symptoms are managed. Prognosis estimated based on 01/08/17 clinical assessment is:   [x] Few to Many Hours  [] Few to Many Days    [] Few to Many Weeks    [] Few to Many Months    Communicated plan of care with: Hospice Case Manager;  Hospice IDT; Care Team     GOALS OF CARE     Resuscitation Status: DNR  Durable DNR: [x] Yes [] No    Advance Care Planning 1/7/2017   Patient's Healthcare Decision Maker is: Legal Next of [address] 69   Primary Decision Maker Name -   Primary Decision Maker Phone Number -   Primary Decision Maker Relationship to Patient -   Confirm Advance Directive None   Patient Would Like to Complete Advance Directive -   Does the patient have other document types -        HISTORY     History obtained from: chart, ED [facility] staff, hospice nurse    CHIEF COMPLAINT:    The patient is:   [] Verbal  [] Nonverbal  [x] Unresponsive    HPI/SUBJECTIVE:          REVIEW OF SYSTEMS     The following systems were: [] reviewed  [x] unable to be reviewed    Positive ROS include:  Constitutional:  Ears/nose/mouth/throat:  Respiratory:  Gastrointestinal:  Musculoskeletal:  Neurologic:  Psychiatric:  Endocrine:     Adult Non-Verbal Pain Assessment Score:0    Face  [x] 0   No particular expression or smile  [] 1   Occasional grimace, tearing, frowning, wrinkled forehead  [] 2   Frequent grimace, tearing, frowning, wrinkled forehead    Activity (movement)  [x] 0   Lying quietly, normal position  [] 1   Seeking attention through movement or slow, cautious movement  [] 2   Restless, excessive activity and/or withdrawal reflexes    Guarding  [x] 0   Lying quietly, no positioning of hands over areas of body  [] 1   Splinting areas of the body, tense  [] 2   Rigid, stiff    Physiology (vital signs)  [x] 0   Stable vital signs  [] 1   Change in any of the following: SBP > 20mm Hg; HR > 20/minute  [] 2   Change in any of the following: SBP > 30mm Hg; HR > 25/minute    Respiratory  [x] 0   Baseline RR/SpO2, compliant with ventilator  [] 1   RR > 10 above baseline, or 5% drop SpO2, mild asynchrony with ventilator  [] 2   RR > 20 above baseline, or 10% drop SpO2, asynchrony with ventilator     FUNCTIONAL ASSESSMENT     Palliative Performance Scale (PPS): 10%     PSYCHOSOCIAL/SPIRITUAL ASSESSMENT     Active Problems:    Respiratory failure ([facility] (1/6/2017)      Past Medical History   Diagnosis Date    Chronic kidney disease (CKD) stage G3b/A3, moderately decreased glomerular filtration rate (GFR) between 30-44 mL/min/1.73 square meter and albuminuria creatinine ratio greater than 300 mg/g 4/25/2014    Diabetes ([facility]     HTN (hypertension)     Hypercholesterolemia     Need for varicella vaccine 8/15/2014    Physical debility 2/17/2014    Polio     Stroke ([facility] 2006      Past Surgical History   Procedure Laterality Date    Hx hernia repair        Social History   Substance Use Topics    Smoking status: Never Smoker    Smokeless tobacco: Never Used    Alcohol use No     Family History   Problem Relation Age of Onset    Stroke Mother     Hypertension Mother     Diabetes Father     Hypertension Father     Stroke Father       No Known Allergies   Current Facility-Administered Medications   Medication Dose Route Frequency    atropine 1 % ophthalmic solution 1 Drop  1 Drop SubLINGual Q4H PRN    LORazepam (INTENSOL) 2 mg/mL oral concentrate 0.5 mg  0.5 mg Oral Q6H    LORazepam (ATIVAN) injection 0.5 mg  0.5 mg IntraVENous Q1H PRN    glycopyrrolate (ROBINUL) injection 0.2 mg  0.2 mg IntraVENous Q4H PRN    bisacodyl (DULCOLAX) suppository 10 mg  10 mg Rectal DAILY PRN    morphine (ROXANOL) 100 mg/5 mL (20 mg/mL) concentrated solution 5 mg  5 mg SubLINGual Q1H PRN    acetaminophen (TYLENOL) suppository 650 mg  650 mg Rectal Q4H PRN    cloNIDine (CATAPRES) 0.3 mg/24 hr patch 1 Patch  1 Patch TransDERmal Q7D        PHYSICAL EXAM     Wt Readings from Last 3 Encounters:   01/01/17 86.6 kg (191 lb)   01/06/17 86.6 kg (190 lb 14.7 oz)   05/19/16 86.7 kg (191 lb 2.2 oz)       Visit Vitals    /81 (BP 1 Location: Left arm, BP Patient Position: At rest)    Pulse 88    Temp 97.7 °F (36.5 °C)    Resp 20    SpO2 90%       Supplemental O2  [] Yes  [] NO  Last bowel movement:     Currently this patient has:  [x] Peripheral IV [] PICC  [] PORT [] ICD    [x] Talavera Catheter [] NG Tube   [] PEG Tube    [] Rectal Tube [] Drain  [] Other:     Constitutional: pt is aphasic, not able to follow commands, moribund, actively dying, acute respiratory failure with excessive airway secretions and increased work of breathing  Eyes: perrl  ENMT: clear  Cardiovascular: rrr  Respiratory: clear  Gastrointestinal: soft obese  Musculoskeletal:flaccid    Skin: warm, dry  Neurologic: obtunded    Psychiatric:   Other:    Pertinent Lab and or Imaging Tests:  Lab Results   Component Value Date/Time    Sodium 149 01/05/2017 02:06 AM    Potassium 3.1 01/05/2017 02:06 AM    Chloride 111 01/05/2017 02:06 AM    CO2 25 01/05/2017 02:06 AM    Anion gap 13 01/05/2017 02:06 AM    Glucose 183 01/05/2017 02:06 AM    BUN 52 01/05/2017 02:06 AM    Creatinine 2.24 01/05/2017 02:06 AM    BUN/Creatinine ratio 23 01/05/2017 02:06 AM    GFR est AA 25 01/05/2017 02:06 AM    GFR est non-AA 21 01/05/2017 02:06 AM    Calcium 8.3 01/05/2017 02:06 AM     Lab Results   Component Value Date/Time    Protein, total 7.7 01/03/2017 02:22 AM    Albumin 3.3 01/03/2017 02:22 AM           Total time: 35  Counseling / coordination time: 35  > 50% counseling / coordination?: y     The LCD for Hospice for the admitted diagnosis of stroke/coma icludes:    Stroke:   [x] Karnofsky Performance Status (KPS) or Palliative Performance Scale (PPS) of 40% or less   Inability to maintain hydration and caloric intake with one of the following:  []Weight loss >10% in the last 6 months or >7.5% in the last 3 months  []Serum albumin <2.5 gm/dl  []Current history of pulmonary aspiration not responsive to speech language pathology intervention  []Sequential calorie counts documenting inadequate caloric/fluid intake  [x]Dysphagia severe enough to prevent the patient from receiving food and fluids necessary to sustain life, in a patient who declines or does not receive artificial nutrition and hydration. Coma (any etiology):    Comatose patients with any 3 of the following on day three of coma:  []abnormal brain stem response;  []absent verbal response;  []absent withdrawal response to pain;  []serum creatinine >1.5 mg/dl. Documentation of diagnostic imaging factors which support poor prognosis after stroke include: For non-traumatic hemorrhagic stroke:   [] Large-volume hemorrhage on CT:  [] Infratentorial: ?20 ml  []Supratentorial: ?50 ml  [] Ventricular extension of hemorrhage  [] Surface area of involvement of hemorrhage ? 30% of cerebrum  [] Midline shift ?1.5 cm  [] Obstructive hydrocephalus in patient who declines, or is not a candidate for, ventriculoperitoneal shunt   For thrombotic/embolic stroke:   [] Large anterior infarcts with both cortical and subcortical involvement  []Large bihemispheric infarcts  []Basilar artery occlusion  []Bilateral vertebral artery occlusion      This patient meets Hospice General Inpatient (GIP) Level of Care. The precipitating event that resulted in the need for GIP was: The interventions tried that have been unsuccessful at controlling symptoms include: oral dosing of medications sudden decline in condition with excessive airway secretions, altered mental status and now respiratory failure with shortness of breath.     Supporting documentation for GIP need for pain control:  [x] Frequent evaluation by a doctor, nurse practitioner, nurse   [x] Frequent medication adjustment    [x] IVs that cannot be administered at home   [] Aggressive pain management   [] Complicated technical delivery of medications              Supporting documentation for GIP need for symptom control:  [x]  Sudden decline necessitating intensive nursing intervention  []  Uncontrolled / intractable nausea or vomiting   []  Pathological fractures  []  Advanced open wounds requiring frequent skilled care  [] Unmanageable respiratory distress  [] New or worsening delirium   [] Delirium with behavior issues  [x] Imminent death  with skilled nursing needs documented above

## 2017-01-09 NOTE — PROGRESS NOTES
Oncology Nursing Communication Tool      6:36 PM  1/9/2017     Bedside shift change report given to SIMON Bill (incoming nurse) by Yuni Kam RN (outgoing nurse) on Jennifer Adams a 80 y.o. female who was admitted on 1/6/2017  3:25 PM. Report included the following information SBAR, Kardex, Procedure Summary, Intake/Output, MAR, Accordion, Recent Results and Med Rec Status. Significant changes during shift: Subcutaneous lines placed today for robinul and morphine administration. Issues for physician to address: None            Code Status: DNR     Infections: No current active infections     Allergies: Review of patient's allergies indicates no known allergies. Current diet: DIET NPO       Pain Controlled [x] yes [] no   Bowel Movement [] yes [x] no   Last Bowel Movement (date) 01/07            Vital Signs:   Patient Vitals for the past 12 hrs:   Temp Pulse Resp BP SpO2   01/09/17 0509 96.9 °F (36.1 °C) 91 24 (!) 187/94 91 %      Intake & Output:     Intake/Output Summary (Last 24 hours) at 01/09/17 1641  Last data filed at 01/09/17 1455   Gross per 24 hour   Intake                0 ml   Output              735 ml   Net             -735 ml      Laboratory Results:   No results found for this or any previous visit (from the past 12 hour(s)). Opportunity for questions and clarifications were given to the incoming nurse. Patient's bed is in low position, side rails x2, door open PRN, call bell within reach and patient not in distress.       Yuni Kam RN

## 2017-01-09 NOTE — PROGRESS NOTES
Oncology Nursing Communication Tool      8:22 PM  1/8/2017     Bedside shift change report given to Vladimir Dobson RN (incoming nurse) by Bahman Dale (outgoing nurse) on Juli Justice a 80 y.o. female who was admitted on 1/6/2017  3:25 PM. Report included the following information SBAR, Kardex, Intake/Output, MAR, Accordion and Recent Results. Significant changes during shift: None      Issues for physician to address: None            Code Status: DNR     Infections: No current active infections     Allergies: Review of patient's allergies indicates no known allergies. Current diet: DIET NPO       Pain Controlled [x] yes [] no   Bowel Movement [] yes [x] no   Last Bowel Movement (date)              Vital Signs:   Patient Vitals for the past 12 hrs:   Temp Pulse Resp BP SpO2   01/08/17 1519 98.6 °F (37 °C) 91 22 (!) 209/97 92 %      Intake & Output:     Intake/Output Summary (Last 24 hours) at 01/08/17 2022  Last data filed at 01/08/17 1833   Gross per 24 hour   Intake                0 ml   Output              775 ml   Net             -775 ml      Laboratory Results:   No results found for this or any previous visit (from the past 12 hour(s)). Opportunity for questions and clarifications were given to the incoming nurse. Patient's bed is in low position, side rails x2, door open PRN, call bell within reach and patient not in distress.       Bahman Dale

## 2017-01-09 NOTE — PROGRESS NOTES
Physical Therapy Screening:  Services are not indicated at this time. [facility] based on results obtained during the nursing admission assessment. The patients chart was reviewed and the patient is not appropriate for a skilled therapy evaluation at this time. Please consult physical therapy if any therapy needs arise. Thank you.     Jermaine Parker, PT, DPT

## 2017-01-09 NOTE — HOSPICE
The  attempted to complete the initial spiritual assessment. The patient was lying in bed unresponsive. The  talked to the patient and read inspirational stories to her. There was no mention of spiritual beliefs from hospice team or hospital assessments. The  offered a blessing to the patient and talked to her about end-of-life issues. The  called the patient's daughter to offer support and glean information about the patient's spirituality. The daughter was not home. Her  said she should be coming to the hospital later today. Left a message with him offering support if needed for the family. SPOC: Unable to assess spiritual acuity. The  will offer spiritual support through pastoral presence and reading.     Frequency: 1 wk 2 begin routine visits 1/10/17; 5 PRN 14

## 2017-01-09 NOTE — PROGRESS NOTES
[facility]  [phone]    Patient Name: Heather Goldberg  YOB: 1930    Date of Provider Hospice Visit: 01/09/17    Level of Care:   [x] General Inpatient (GIP)    [] Routine   [] Respite    Location of Care:  [] [facility] [] [facility] [x] [address] [] [facility] [] Hospice [facility]  [] Home [] Other:      Date of Hospice Admission: 1-6-17  Hospice Attending: Silvia Ruiz MD    Principle Hospice Diagnosis: Acute CVA  Diagnoses RELATED to the terminal prognosis: HTN, debility, polio ( bedbound). ckd 3  Other Diagnoses: hx cva with right weakness, DM-2             HOSPICE NARRATIVE COMPOSED BY PHYSICIAN   Rationale for a prognosis of life expectancy of 6 months or less if the disease follows its normal course:  Devon Denise is a 80y.o. year old who was admitted to [facility]. The patient's principle diagnosis of Acute CVA has resulted in aphasia, right hemiplegia decreased mental status and hospitalization since 1-1-17. Admitted for AMS and aphasia. Has acute left MCA CVA by MRI. No TPA> Pt has been NPO, not candidate for feeding tube. Pt has remained restless and agitated with some moaning. Has had Functionally, the patient's Palliative Performance Scale has declined over a period of days and is estimated at 20. Objective information that support this patients limited prognosis includes: Brain MRI 1-2-17    IMPRESSION:  Acute, moderate-sized left posterior MCA territory infarction. Background of  extensive chronic ischemic changes and areas of prior hemorrhage as above.      The patient/family chose comfort measures with the support of Hospice.               HOSPICE DIAGNOSES   Active Symptoms:  1. Agitation  2. Restlessness  3. Excess secretions  4. Moribund, actively dying  5. Increased shortness of breath and work of breathing      Secretions and congestion much worse today. Pt is unresponsive     PLAN   1. Continue  hospice GIP for restlessness and agitation  2.  Sc morphine 2mg q4  3. Change Robinul sc q4  4. NPO except sl medicine. 5. Clonidine patch for HTN     7.  and SW to support family needs  8. Disposition: to LTC/SNF with hospice when symptoms are managed. Prognosis estimated based on 01/09/17 clinical assessment is:   [x] Few to Many Hours  [] Few to Many Days    [] Few to Many Weeks    [] Few to Many Months    Communicated plan of care with: Hospice Case Manager;  Hospice IDT; Care Team     GOALS OF CARE     Resuscitation Status: DNR  Durable DNR: [x] Yes [] No    Advance Care Planning 1/7/2017   Patient's Healthcare Decision Maker is: Legal Next of [address]   Primary Decision Maker Name -   Primary Decision Maker Phone Number -   Primary Decision Maker Relationship to Patient -   Confirm Advance Directive None   Patient Would Like to Complete Advance Directive -   Does the patient have other document types -        HISTORY     History obtained from: chart, ED [facility] staff, hospice nurse    CHIEF COMPLAINT:    The patient is:   [] Verbal  [] Nonverbal  [x] Unresponsive    HPI/SUBJECTIVE:          REVIEW OF SYSTEMS     The following systems were: [] reviewed  [x] unable to be reviewed    Positive ROS include:  Constitutional:  Ears/nose/mouth/throat:  Respiratory:  Gastrointestinal:  Musculoskeletal:  Neurologic:  Psychiatric:  Endocrine:     Adult Non-Verbal Pain Assessment Score:0    Face  [x] 0   No particular expression or smile  [] 1   Occasional grimace, tearing, frowning, wrinkled forehead  [] 2   Frequent grimace, tearing, frowning, wrinkled forehead    Activity (movement)  [x] 0   Lying quietly, normal position  [] 1   Seeking attention through movement or slow, cautious movement  [] 2   Restless, excessive activity and/or withdrawal reflexes    Guarding  [x] 0   Lying quietly, no positioning of hands over areas of body  [] 1   Splinting areas of the body, tense  [] 2   Rigid, stiff    Physiology (vital signs)  [x] 0   Stable vital signs  [] 1   Change in any of the following: SBP > 20mm Hg; HR > 20/minute  [] 2   Change in any of the following: SBP > 30mm Hg; HR > 25/minute    Respiratory  [x] 0   Baseline RR/SpO2, compliant with ventilator  [] 1   RR > 10 above baseline, or 5% drop SpO2, mild asynchrony with ventilator  [] 2   RR > 20 above baseline, or 10% drop SpO2, asynchrony with ventilator     FUNCTIONAL ASSESSMENT     Palliative Performance Scale (PPS): 10%     PSYCHOSOCIAL/SPIRITUAL ASSESSMENT     Active Problems:    Respiratory failure ([facility] (1/6/2017)      Past Medical History   Diagnosis Date    Chronic kidney disease (CKD) stage G3b/A3, moderately decreased glomerular filtration rate (GFR) between 30-44 mL/min/1.73 square meter and albuminuria creatinine ratio greater than 300 mg/g 4/25/2014    Diabetes ([facility]     HTN (hypertension)     Hypercholesterolemia     Need for varicella vaccine 8/15/2014    Physical debility 2/17/2014    Polio     Stroke ([facility] 2006      Past Surgical History   Procedure Laterality Date    Hx hernia repair        Social History   Substance Use Topics    Smoking status: Never Smoker    Smokeless tobacco: Never Used    Alcohol use No     Family History   Problem Relation Age of Onset    Stroke Mother     Hypertension Mother     Diabetes Father     Hypertension Father     Stroke Father       No Known Allergies   Current Facility-Administered Medications   Medication Dose Route Frequency    glycopyrrolate (ROBINUL) injection 0.2 mg  0.2 mg SubCUTAneous Q4H    morphine injection 2 mg  2 mg SubCUTAneous Q4H    scopolamine (TRANSDERM-SCOP) 1.5 mg  1.5 mg TransDERmal Q72H    morphine injection 2 mg  2 mg SubCUTAneous Q1H PRN    LORazepam (INTENSOL) 2 mg/mL oral concentrate 0.5 mg  0.5 mg Oral Q6H    bisacodyl (DULCOLAX) suppository 10 mg  10 mg Rectal DAILY PRN    acetaminophen (TYLENOL) suppository 650 mg  650 mg Rectal Q4H PRN    cloNIDine (CATAPRES) 0.3 mg/24 hr patch 1 Patch  1 Patch TransDERmal Q7D        PHYSICAL EXAM Wt Readings from Last 3 Encounters:   01/01/17 191 lb (86.6 kg)   01/06/17 190 lb 14.7 oz (86.6 kg)   05/19/16 191 lb 2.2 oz (86.7 kg)       Visit Vitals    BP (!) 187/94 (BP 1 Location: Left arm, BP Patient Position: At rest)    Pulse 91    Temp 96.9 °F (36.1 °C)    Resp 24    SpO2 91%       Supplemental O2  [] Yes  [] NO  Last bowel movement:     Currently this patient has:  [x] Peripheral IV [] PICC  [] PORT [] ICD    [x] Talavera Catheter [] NG Tube   [] PEG Tube    [] Rectal Tube [] Drain  [] Other:     Constitutional: pt is aphasic, not able to follow commands, moribund, actively dying, acute respiratory failure with excessive airway secretions and increased work of breathing  Eyes: perrl  ENMT: clear  Cardiovascular: rrr  Respiratory: clear  Gastrointestinal: soft obese  Musculoskeletal:flaccid    Skin: warm, dry  Neurologic: obtunded    Psychiatric:   Other:    Pertinent Lab and or Imaging Tests:  Lab Results   Component Value Date/Time    Sodium 149 01/05/2017 02:06 AM    Potassium 3.1 01/05/2017 02:06 AM    Chloride 111 01/05/2017 02:06 AM    CO2 25 01/05/2017 02:06 AM    Anion gap 13 01/05/2017 02:06 AM    Glucose 183 01/05/2017 02:06 AM    BUN 52 01/05/2017 02:06 AM    Creatinine 2.24 01/05/2017 02:06 AM    BUN/Creatinine ratio 23 01/05/2017 02:06 AM    GFR est AA 25 01/05/2017 02:06 AM    GFR est non-AA 21 01/05/2017 02:06 AM    Calcium 8.3 01/05/2017 02:06 AM     Lab Results   Component Value Date/Time    Protein, total 7.7 01/03/2017 02:22 AM    Albumin 3.3 01/03/2017 02:22 AM           Total time: 35  Counseling / coordination time: 35  > 50% counseling / coordination?: y     The LCD for Hospice for the admitted diagnosis of stroke/coma icludes:    Stroke:   [x] Karnofsky Performance Status (KPS) or Palliative Performance Scale (PPS) of 40% or less   Inability to maintain hydration and caloric intake with one of the following:  []Weight loss >10% in the last 6 months or >7.5% in the last 3 months  []Serum albumin <2.5 gm/dl  []Current history of pulmonary aspiration not responsive to speech language pathology intervention  []Sequential calorie counts documenting inadequate caloric/fluid intake  [x]Dysphagia severe enough to prevent the patient from receiving food and fluids necessary to sustain life, in a patient who declines or does not receive artificial nutrition and hydration. Coma (any etiology):    Comatose patients with any 3 of the following on day three of coma:  []abnormal brain stem response;  []absent verbal response;  []absent withdrawal response to pain;  []serum creatinine >1.5 mg/dl. Documentation of diagnostic imaging factors which support poor prognosis after stroke include: For non-traumatic hemorrhagic stroke:   [] Large-volume hemorrhage on CT:  [] Infratentorial: ?20 ml  []Supratentorial: ?50 ml  [] Ventricular extension of hemorrhage  [] Surface area of involvement of hemorrhage ? 30% of cerebrum  [] Midline shift ?1.5 cm  [] Obstructive hydrocephalus in patient who declines, or is not a candidate for, ventriculoperitoneal shunt   For thrombotic/embolic stroke:   [] Large anterior infarcts with both cortical and subcortical involvement  []Large bihemispheric infarcts  []Basilar artery occlusion  []Bilateral vertebral artery occlusion      This patient meets [facility] General Inpatient (GIP) Level of Care. The precipitating event that resulted in the need for GIP was: The interventions tried that have been unsuccessful at controlling symptoms include: oral dosing of medications sudden decline in condition with excessive airway secretions, altered mental status and now respiratory failure with shortness of breath.     Supporting documentation for GIP need for pain control:  [x] Frequent evaluation by a doctor, nurse practitioner, nurse   [x] Frequent medication adjustment    [x] IVs that cannot be administered at home   [] Aggressive pain management   [] Complicated technical delivery of medications              Supporting documentation for GIP need for symptom control:  [x]  Sudden decline necessitating intensive nursing intervention  []  Uncontrolled / intractable nausea or vomiting   []  Pathological fractures  []  Advanced open wounds requiring frequent skilled care  [] Unmanageable respiratory distress  [] New or worsening delirium   [] Delirium with behavior issues  [x] Imminent death  with skilled nursing needs documented above

## 2017-01-09 NOTE — ROUTINE PROCESS
Oncology Nursing Communication Tool      8:06 AM  1/9/2017     Bedside shift change report given to Inessa Garsia RN (incoming nurse) by Karen Gaines RN (outgoing nurse) on Magda Net a 80 y.o. female who was admitted on 1/6/2017  3:25 PM. Report included the following information SBAR, Kardex, Intake/Output and MAR. Significant changes during shift: none      Issues for physician to address: none            Code Status: DNR     Infections: No current active infections     Allergies: Review of patient's allergies indicates no known allergies. Current diet: DIET NPO       Pain Controlled [x] yes [] no   Bowel Movement [] yes [x] no   Last Bowel Movement (date) 1/7/17            Vital Signs:   Patient Vitals for the past 12 hrs:   Temp Pulse Resp BP SpO2   01/09/17 0509 96.9 °F (36.1 °C) 91 24 (!) 187/94 91 %      Intake & Output:     Intake/Output Summary (Last 24 hours) at 01/09/17 0806  Last data filed at 01/09/17 2306   Gross per 24 hour   Intake                0 ml   Output              735 ml   Net             -735 ml      Laboratory Results:   No results found for this or any previous visit (from the past 12 hour(s)). Opportunity for questions and clarifications were given to the incoming nurse. Patient's bed is in low position, side rails x2, door open PRN, call bell within reach and patient not in distress.       Karen Gaines RN

## 2017-01-10 NOTE — ROUTINE PROCESS
Pt unresponsive with irregular, abdominal respirations on initial assessment. At 2359 when scheduled SL ativan given pt noted to be having periods of apnea. Agonal respirations noted at 0010 & this nurse remained with pt until she . No pulse or respirations noted at 0028. Notified Sarita Crouch, nursing supervisor, at 6735 who called hospitalist to pronounce.  & hospice RN paged at 2030.  to the unit at [address]. Ike Doshi, hospice RN, notified of death at 36. [employer] notified at [phone] & pt declined for any donation. Pt's daughter, Corin Almazan, notified of death at 36. Family is not coming in. No pt belongings. Awaiting MD to pronounce pt.    0412-Dr. Silvestre to the unit to pronounce pt at 0302. Body prepared & awaiting transport to [facility].

## 2017-01-10 NOTE — PROGRESS NOTES
Responded to call from [address] at time of patient's death. No family present at the bedside and according to RN they are not intending to return to the hospital. Offered prayer and time of silence at bedside. Chaplain Zulema Angel M.Div.    Paging Service [phone])

## 2017-01-10 NOTE — HOSPICE
[facility]  [facility]  [phone]     GIP Daily Nursing Note   Patient Name: Jam Lopez  YOB: 1930  Age: 80 y.o.     Date of Visit: 01/09/17  Facility of Care: [facility]  Patient Room: [address] [place] [address] Attending: Brook Sampson MD  Hospice Diagnosis: hospice  Respiratory failure [facility])     Level of Care: GIP  GIP Symptoms: agitation, restlessness, newly developing upper airway secretions, resp distress.     Current GIP Symptoms    1. Pt is now unresponsive with no restlessness/agitation as a result of scheduled morphine and ativan. 2.Developed extreme upper airway secretions made worse by sl ativan, morphine and ativan. Discontinued sl atropine and morphine, added scopolamine patch, 2mg sc morphine every 4 hrs scheduled and sc robinul 0.2mg every 4 hrs scheduled. 3. resp distress. Developing extreme abdominal retraction with each breath. PRN morphine sc available in addition to scheduled. Anticipate improvement with transition to sc morphine.     ASSESSMENT & PLAN   Must update Plan of Care including visit frequencies for IDT members  1. Continue to monitor the pt's symptoms and her comfort level. Provide resources to make sure pt remains comfortable.         Spiritual Interventions:      Psych/ Social/ Emotional Interventions: daughter Mir Rosen at bedside, very tearful telling stories of caring for mother for the last 28 years.     Care Coordination Needs: Family will need hep with dispo once the pt is comfortable      Care plan and New Orders discussed with Dr Timmy Cervantes     Description History and Chart Review   If this is initial GIP note must document RN assessment/MD communication in previous setting.  Specifically document nursing/medication needs in last 24 hours to support GIP care  Narrative History of last 24 hours that demonstrates care cannot be provided in another setting:  Pt requires 24hr skilled assessment and medication adjustment.   What has been done to control the patient's symptoms in the last 24 hours? See above     Does the patient currently require IV medications? yes  Does the patient currently require scheduled medications? yes  Does the patient currently require a PCA? no     List number of doses of PRN medications in last 24 hours:  Medication 1:   Number of doses:      Medication 2:  Number of doses:     Medication 3:   Number of doses:     Supporting documentation for GIP need for pain control:  [x] Frequent evaluation by a doctor, nurse practitioner, nurse   [x] Frequent medication adjustment   [x] IVs that cannot be administered at home   [] Aggressive pain management   [] Complicated technical delivery of medications              Supporting documentation for GIP need for symptom control:  [x] Sudden decline necessitating intensive nursing intervention  [] Uncontrolled / intractable nausea or vomiting   [] Pathological fractures  [] Advanced open wounds requiring frequent skilled care  [] Unmanageable respiratory distress  [] New or worsening delirium   [] Delirium with behavior issues: Is 24 hour caregiver present due to safety concerns with agitation? (yes/no)  [x] Imminent death  with skilled nursing needs documented above     DISCHARGE PLANNING   Daily discharge planning required for GIP  1. Discharge Plan: home with hospice   2. Patient/Family teaching:   3. Response to patient/family teaching:      ASSESSMENT    KARNOFSKY: 30     Prognosis estimated based on 01/08/17 clinical assessment is:   [] Few to Many Hours  [x] Hours to Days   [] Few to Many Days   [] Days to Weeks   [] Few to Many Weeks   [] Weeks to Months   [] Few to Many Months     Quality Measure: Patient self-reports:  [] Yes  [x] No     ESAS:   Time of Assessment: 4:00pm  Upper airway secretions:  7  Pain (1-10): 0  Fatigue (1-10): 0  Shortness of breath (1-10): 4  Nausea (1-10): Appetite (1-10):    Anxiety: (1-10):   Depression: (1-10):   Well-being: (1-10):   Constipation: _ Yes _ No  LAST BM:      CLINICAL INFORMATION   Patient Vitals for the past 12 hrs:  Visit Vitals    BP (!) 187/94 (BP 1 Location: Left arm, BP Patient Position: At rest)    Pulse 91    Temp 96.9 °F (36.1 °C)    Resp 24    SpO2 91%      Currently this patient has:  [x] Supplemental O2   [] IV    [] PICC     [] PORT   [] NG Tube    [] PEG Tube   [] Ostomy   [x] Talavera draining _amber with sediment______ urine  [x] Other: subcutaneous lines x 2   SIGNS/PHYSICAL FINDINGS      Skin (including wound):  [] Warm, dry, supple, intact and color normal for race  [x] Warm   [] Dry   [] Cool    [x] Clammy    [] Diaphoretic   Turgor  [] Normal  [x] Decreased  Color:  [x] Pink  [] Pale  [] Cyanotic  [] Erythema  [] Jaundice  [] Normal for Race  []  Wounds:     Neuro:  [] Lethargy  [] Restlessness / agitation  [] Confusion / delirium  [] Hallucinations  [] Responds to maximal stimulation  [x] Unresponsive  [] Seizures      Cardiac:  [] Dyspnea on Exertion  [] JVD  [] Murmur  [] Palpitations  [] Hypotension  [x] Hypertension  [] Tachycardia  [] Bradycardia  [] Irregular HR  [] Pulses Decreased  [] Pulses Absent  [x] Edema: anasarca (Location, Grade and Pitting)  [] Mottling: (Location)     Respiratory:  Upper airway secretions  Breath sounds:   [x] Diminished  [] Wheeze  [x] Rhonchi  [] Rales   [] Even and unlabored  [] Labored:    [] Cough  [] Non Productive  [] Productive  [] Description:    [] Deep suctioned   [x] O2 at _2__ LPM  [] High flow oxygen greater than 10 LPM  [] Bi-Pap     GI  [] Abdomen (describe)   [] Ascites  [] Nausea  [] Vomiting  [x] Incontinent of bowels  [] Bowel sounds (yes/no)  [] Diarrhea  [] Constipation (see above including last bowel movement)  [] Checked for impaction  [] Last BM      Nutrition  Diet:__NPO________  Appetite:   [] Good   [] Fair   [] Poor   [] Tube Feeding        [] Voiding  [x] Incontinent   [x] Talavera     Musculoskeletal  [] Balance/Troy Unsteady   [x] Weak   Strength:   [] Normal [] Limited   [] Decreasing   Activities:   [] Up as tolerated  [x] Bedridden   [] Specify:     SAFETY  [x] 24 hr. Caregiver   [x] Side rails ?     [x] Hospital bed   [] Reviewed Falls & Safety         ALLERGIES AND MEDICATIONS      Allergies: No Known Allergies            Current Facility-Administered Medications   Medication Dose Route Frequency    atropine 1 % ophthalmic solution 1 Drop 1 Drop SubLINGual Q4H PRN    morphine (ROXANOL) 100 mg/5 mL (20 mg/mL) concentrated solution 5 mg 5 mg SubLINGual Q2H    LORazepam (INTENSOL) 2 mg/mL oral concentrate 0.5 mg 0.5 mg Oral Q6H    LORazepam (ATIVAN) injection 0.5 mg 0.5 mg IntraVENous Q1H PRN    glycopyrrolate (ROBINUL) injection 0.2 mg 0.2 mg IntraVENous Q4H PRN    bisacodyl (DULCOLAX) suppository 10 mg 10 mg Rectal DAILY PRN    acetaminophen (TYLENOL) suppository 650 mg 650 mg Rectal Q4H PRN    cloNIDine (CATAPRES) 0.3 mg/24 hr patch 1 Patch 1 Patch TransDERmal Q7D             Visit Time In: 4:30pm  Visit Time Out: 5:30pm

## 2017-01-11 PROCEDURE — 0656 HSPC GENERAL INPATIENT

## 2017-01-12 PROCEDURE — 0656 HSPC GENERAL INPATIENT

## 2017-01-13 ENCOUNTER — HOME CARE VISIT (OUTPATIENT)
Dept: HOSPICE | Facility: HOSPICE | Age: 82
End: 2017-01-13
Payer: MEDICARE

## 2017-01-13 PROCEDURE — 0656 HSPC GENERAL INPATIENT

## 2017-01-14 PROCEDURE — 0656 HSPC GENERAL INPATIENT

## 2017-01-14 NOTE — DISCHARGE SUMMARY
Discharge Summary    [facility]  [facility]  [phone]      Date of Admission: 1/6/2017  Date of Discharge: 1/10/2017    Kearney Cooks is a 80y.o. year old who was admitted to [facility] at [facility] with a Hospice diagnosis of hospice; Respiratory failure ([address] The patient's care was focused on comfort and the patient passed away on 1/10/2017.

## 2017-01-15 PROCEDURE — 0656 HSPC GENERAL INPATIENT

## 2017-01-16 PROCEDURE — 0656 HSPC GENERAL INPATIENT

## 2017-01-17 PROCEDURE — 0656 HSPC GENERAL INPATIENT

## 2017-01-18 PROCEDURE — 0656 HSPC GENERAL INPATIENT

## 2017-01-19 PROCEDURE — 0656 HSPC GENERAL INPATIENT

## 2017-01-20 PROCEDURE — 0656 HSPC GENERAL INPATIENT

## 2017-01-21 PROCEDURE — 0656 HSPC GENERAL INPATIENT

## 2017-01-22 PROCEDURE — 0656 HSPC GENERAL INPATIENT

## 2017-01-23 PROCEDURE — 0656 HSPC GENERAL INPATIENT

## 2017-01-24 PROCEDURE — 0656 HSPC GENERAL INPATIENT

## 2017-01-25 PROCEDURE — 0656 HSPC GENERAL INPATIENT

## 2017-01-26 PROCEDURE — 0656 HSPC GENERAL INPATIENT

## 2017-01-27 PROCEDURE — 0656 HSPC GENERAL INPATIENT

## 2017-01-28 PROCEDURE — 0656 HSPC GENERAL INPATIENT

## 2017-01-29 PROCEDURE — 0656 HSPC GENERAL INPATIENT

## 2017-01-30 PROCEDURE — 0656 HSPC GENERAL INPATIENT

## 2023-04-10 NOTE — HOSPICE
[facility] Patient is resting comfortably now, she is not responsive. She has needed scheduled medications to keep her comfortable. Daughter Fiona Oropeza in to visit patient today. She is at peace with her mother being more comfortable. Discussed with her that patient if stable,  would need to look at alternate setting. Fiona Oropeza would like to take patient home if she is stable. Patient has Medicaid but family has never used long term care. She has had an assessment done, was given a list of agencies but does not know where that is. Josephine Ovalles a list of [address] [place] [address]. Explained to her that patient could have hospice services at home. Fiona Oropeza shared that patient became a  at a very young age (when Fiona Oropeza was 6years old) and she raised six daughters on her own as a single parent. She never worked. She took care of her family. Family will be assisted in transition planning as needed. We will assess patient again tomorrow and evaluate transition planning. Soolantra Pregnancy And Lactation Text: This medication is Pregnancy Category C. This medication is considered safe during breast feeding.